# Patient Record
Sex: MALE | Employment: OTHER | URBAN - METROPOLITAN AREA
[De-identification: names, ages, dates, MRNs, and addresses within clinical notes are randomized per-mention and may not be internally consistent; named-entity substitution may affect disease eponyms.]

---

## 2017-02-21 ENCOUNTER — HOSPITAL ENCOUNTER (OUTPATIENT)
Dept: PULMONOLOGY | Age: 77
Discharge: HOME | End: 2017-02-21
Payer: MEDICARE

## 2017-02-21 PROCEDURE — G0424 PULMONARY REHAB W EXER: HCPCS

## 2017-02-21 NOTE — PROGRESS NOTES
[facility]  Outpatient Pulmonary Rehabilitation  Patient Assessment and Treatment Plan  Time in: 10:00   Time out: 12:00  DEMOGRAPHIC & RELATED DATA Patient Name: Amadou Barrett  [unfilled]  [unfilled]     1940 Age  78 y.o. Social Security #  /Medical Record #  [id number]  [id number]   Sex  M Marital Status     Emergency Contact Name  Extended Emergency Contact Information  Primary Emergency Contact: [place]  Address: [address] [facility] [address] Phone: [phone]  Relation: Spouse Phone (Area Code) Number     Emergency Contact Address     REFERRAL Source Name:  Padmini Tavares MD  [address] [facility], [address] Phone           Number   Referring Diagnosis  COPD Gold 3   Chief Complaint  SOB/Fatigue   PHYSICIANS PCP Name:    Cailtin Lewis MD Phone      (Area Code)      Number       Address:   [place]         (Area Code)      Number       Pulmonologist Name:  Maribell Bartholomew.  Phone      (Area Code)      Number       Address: FAX         (Area Code)      Number       HOME HEALTH If Yes, Agency Name  No (validate)   Phone      (Area Code)      Number       [] Yes    [x] No Address FAX         (Area Code)      Number       PATIENT REPORTING OF MEDICAL HISTORY Check All that Apply   Sleep Symptoms Daily Symptoms Additional Medical Disorders     [x] Within normal limits   [] Nocturnal                  Hypoxemia     [] Obstructive Sleep           Apnea     [] Increased sleep   [] Decreased sleep   [] Night Sweats   [] Insomnia   []Frequent Urination   [] Muscle Spasms    [] Daytime Sleepiness   [] Shortness of                    breath   [] Nightmares   [] Leg Jerking   [] Snoring   [] Sleeps in chair/sofa       due to shortness of      breath     [] Coughing   [] Heart Palpitations   [] Feather Pillows               used    [] Excessive                     worry   [] Nervousness   [] Panic episodes   [] Depressed [] Cough   [] Cough with                    Sputum     [] Shortness of          breath at rest     [] Wheezing     [] Chest Pain  [x] Shortness of        breath with mild exertion     [x] Shortness       of breath with moderate        exertion     [x] Weakness       /Fatigue   [] Lacking       endurance   [] Presence of         barrel chest   [] Utilizes                    shoulders,      chest & neck to aid in breathing     []  Dizziness     [] Ankle swelling     [] Hoarseness    [] Hypoxemia   [] Diabetes   []Neuropathy   [x]Hypertension   [] Osteoarthritis   [] Osteoporosis   [] Rheumatoid Arthritis     [] Fibromyalgia   [] Scoliosis  []Coronary Artery Disease   [] Atrial Fibrillation []Abnormal Posture   [x] Debility   [] CHF   [] Family history of COPD     [] Other:     RESPIRATORY HOME CARE EQUIPMENT   Use of the following:    []- Peak Flow Meter     []- Aerosol machine         []- Suction machine     []- Ventilator    []- Mechanical chest percussor    []- PEP valve   []- Oxygen:   Flow Rate: NA    []- None                                                                                       System:       See Summary List for additional Medical and Surgical History, Medications, and Allergies     PULMONARY HISTORY   Cough present: []- Yes     [x]- No    []- AM     []- PM    []- Nighttime     []- Around the clock       Mucus:  Color and amount            []- Thick    []- Thin   []- Moderate        When:  []- AM    []- PM   []- Around the clock    Mucus raised and/or alleviated by:     How often do you see your pulmonologist:    [x]- Every month    []- Every quarter  []- Every six months  []- Other:     Typically how often do you have lung infections: every few years    Number of times youve been hospitalized due to pulmonary condition (in last year):1    Number of times youve visited the E.D. due to pulmonary conditions (in last year):  0  Shortness of breath is alleviated by: seated rest, rescue inhaler VACCINATIONS []   Flu 95    [x]  Pneumonia      [x] Tetanus        IF NOT, Staff recommendation:   Recommend flu shot     OCCUPATIONAL / ENVIRONMENTAL HISTORY Retired? [] - YES              [x] - NO Type of work:   coaching   Occupational exposure: [] None [] Welding            [x] Asbestos      [] Mines/foundry         [] Pottery     [] Other   Residence  Type [x]     House                 []     Apartment                []     [address]                []     Assisted Living       []     Residential Living                []     Other: # Occupants  2 Relationship with Residents:   wife        # Levels: 1    # Stairs between levels:    # Stairs to enter: 4            Household pets:    []   Yes   [x]   No    If so, type:     Comments:    EDUCATIONAL HISTORY Highest Grade Achieved: Grade:    College Degree(s) Obtained / Major:  2 years of college   Orientation:      [x]- Time       [x]- Place       [x]- Person       []- Situation     If disoriented in any sphere, describe:  Prefers to Learn By:    []- Audio         []- Visual         []- Demonstration             []- Printed Material         [x]- No Preference   Barriers to Learning:   [x]- None         []- Language          []- Vision          []- Hearing          []- Emotional          []- Cognitive         Other (Describe):     ETHNIC / CULTURAL ISSUES Primary Language: [x]- English     Other  Specify:       Any Rigious, Ethnic or Cultural Practices/ Beliefs that May Influence Treatment?        []- Yes     [x]- No   If Yes, Describe:     HEARING/VISION/MOBILITY/ADLS Prosthesis:   [x]- None   Describe:   []- Hearing Impaired   ð Hearing Aids:   []- None     []-Bilaterally     []- Right     []- Left   [x]- Visually Impaired  ð  []- Legally Blind       [x]- Glasses       []- Contact Lenses   Ambulation:   [x]- By Self     []- Ambulates with Assistance ð  []- Cane    []- Crutches     []- Brittany Hoit       []- Wheelchair      []- Unable to Ambulate   Adaptive Equipment: []- None      [x]- Shower Seat       [x]- Grab Bars       []- Reachers       [x]-Non-Slip Floor Mat   Comments:   PAIN ASSESSMENT  Numeric Pain Scale 1-10 Is pain present or has it been present within the past 6 months? yes     Chest Pain:  []- Yes     [x]- No    If yes, rating:   Level at which pain is unacceptable:   Freq/Duration:     Aggravated by: Alleviated by:      Other Pain Location and Rating:  []- Fingers ____    []- Hand ____   []- Wrist ____    []- Elbow ____    []- Shoulder ____ []- Neck ____                                              []- Back ____   [x]- Hip left____    []- Leg ____   []- Knee ____   []- Ankle ____   []- Foot ____    []- Toes ____  []- Other _____________    Level at which pain is unacceptable: 5   Frequency/Duration: daily    Aggravated by: walking     Alleviated by: silvia brown      INSPIRATORY MUSCLE ASSESSMENT   Negative Inspiratory Force Test Results:  cwp* (< -60 cwp indicates IMT need)    [x]- Not applicable                []- Not available       FALL RISK ASSESSMENT If 3 or more are checked, patient requires Fall Precautions   [] -  History of Fall(s) within last 3 months   [x] -  Altered Mental Status - altered level of consciousness, confused/disoriented, unable to understand/remember, cognitive impairment, or alcohol use   [] -  Altered Mobility - unsteady gait, requires use of assistive device (cane, wheelchair, walker), or requires staff assistance   [x] -  Predisposing Diseases/Diagnosis - sensory impairments, neurological disorder, hypotension, orthostatic hypotension, vertigo, seizures, arthritis/osteoporosis, or age less than 3 years or greater than 65 years   [] -  Environment  IV, ECG leads, O2, [place]   [] -  Medications  does patient take 4 or more home medications     FUNCTIONAL ACTIVITIES OF DAILY LIVING  Check All Deficits related to shortness of breath, fatigue, pain and/or emotional or psychological elements   Functional Mobility Bathing/Grooming/Dressing Household activities Meal/yard maintenance   [] Bed transfers  [] Chair transfers  [] Toilet transfers  [] Automobile transfers  [] Tub/shower transfers  [x] Ambulation on level ground  [x] Ambulation on slight incline  [x] Stairs  [x]  Carrying objects  []  Grocery shopping  []  Walking to [employer] []  Bathing / Showering  []  Washing hair  []  Washing / Drying upper body  []  Washing / Drying lower body  []  Combing hair  []  Brushing teeth  [] Make-up application  []  Shaving  []  Upper body dressing  []  Lower body dressing  []  Donning / Home socks and/or shoes [] Vacuuming   Has never performed NA  [] Mopping  [] Sweeping  [] Garbage removal  [] Making bed  [] Changing sheets  [] Placing / Retrieving clothes in /       out washer    [] Placing / Retrieving clothes in /       out dryer    [] Ironing  [] Sorting / Folding clothes  [] Hanging clothes [] Light meal preparation  [] Cooking  [] Eating  [] Clean-up  [] Washing dishes  [] Loading / unloading     [] Pulling weeds  [] Planting flowers  [] Gardening  [] Raking leaves  [] Mowing grass   [] Painting   PATIENT REPORT OF PRIOR FUNCTIONAL LEVEL  In patients words, what could he/she do before breathing problems began? Did whatever he wanted   PATIENT REPORT OF CHANGE(S) IN FUNCTIONAL LEVEL In patients words, what has changed since breathing problems began? Slowed down activities. TRANSPORTATION CAPABILITY   []- Able to provide own transportation. [x]- Patient drives    []- Lack of reliable transportation. []- Medically unable to drive. []- Has a handicapped sticker    []- Patient has someone to drive them if needed    []- Financial constraints prohibit ability to provide own  transport. Determination of Need for Transportation (Check all that apply)       TOBACCO USE Current   Use?  Age Began Route Usual Daily Amount / Frequency Date Last Used    yes 15 cigarettes  current   Are you willing to participate in a Smoking Cessation Program? Yes NOTE:  Patients who currently smoke & are unwilling to stop tobacco use and/or participate in a smoking cessation program are ineligible for pulmonary rehabilitation. Comments:      EDUCATIONAL NEEDS Check All that Apply   COPD & LUNG DISEASE Lacks understanding of components of disease no    Lacks understanding of disease and disease progression no    Lacks understanding of environmental changes affecting lung function (temperature, humidity, wind, allergies, etc.) yes   BREATHING RE-TRAINING Lacks independent Pursed Lip Breathing (PLB)? yes    Lacks independent Diaphragmatic Breathing (DB)? yes    Narinder Rate of Perceived Exertion (RPE) Scale of > ten (10) while resting? no    Exhibits abnormal respiratory rate? no    Rests arms on table to recruit accessory muscles? no   COLLABORATIVE SELF-MANAGEMENT   (Signs & symptoms of Infections & Peak Flow Meters) Exhibits > one (1) lung infections(s) in the past year? yes    Lacks evidence of self-monitoring signs for infections? yes   MEDICATIONS Non-compliant with medications? no    Exhibits knowledge deficit related to side effects of medication? no    Exhibits knowledge deficit related to use and purpose of medications? no    Prescribed supplemental Oxygen                                                                         [] N/A  Not applicable    Exhibits knowledge deficit related to oxygen use? [] N/A                                                             Not applicable    Unable to demonstrate correct usage of Metered Dose Inhaler (if indicated)? [] N/A   No    Unable to demonstrate correct usage of spacer (if indicated)? [] N/A  Not applicable   TRAVEL & ENVIRONMENT Exhibits allergic reaction to allergens (pets, plants, dust, mold, etc.)?  yes and n/a    Travel restricted due to shortness of breath? Yes   BRONCHOPULMONARY HYGIENE /   SECRETION CLEARANCE & MANAGEMENT Complains of sputum/mucus production? no    Diagnosed as having chronic bronchitis? no    Diagnosed as having bronchiectasis? no    Demonstrates poor cough/baez techniques? yes    Complains of loose non-productive cough? no    Patient uses chest physiotherapy (CPT)? no    Patient uses high frequency chest wall oscillation (HFCWO)? no    Exhibits knowledge deficit related to proper fluid intake? yes   SEXUALITY &   LUNG DISEASE Patient desires information on sexuality with lung disease? yes   STRESS MANAGEMENT/  EMOTIONAL HEALTH Patient exhibits or reports increased levels of stress? no    Patient does not practice relaxation techniques?  yes     PSYCHOSOCIAL ASSESSMENT Mental and Emotional functioning impacted by deficits, limitations, and/or symptoms associated with the patients pulmonary disease          Attitude                   [x] - Cooperative          [] - Guarded          [] - Uncooperative          [] - Hostile        [] - Apathetic       [] - Defensive            Self-concept             [x] - Intact         [] - Helpless          [] - Hopeless          [] - Grandiose        [] - Self-deprecating       [] - Depersonalized            Consciousness         [x] - Alert          [] - Hyper-alert      [] - Lethargic          [] - Clouded           [] - Unresponsive       [] - Lacks focus/attention            Mood                       [x] - Stable       [] - Depressed     [] - Anxious       [] - Irritable        [] - Angry                                                     [] - Calm           [] - Euphoric        [] - Guilty           [] - Grieving                   Areas of function acutely / negatively affected by patients pulmonary disease:        [] - Marital/Intimacy/Family       [] - Financial        [] - Safety     [] - Spiritual          [] - Sexual             [] - Activities of Daily Living       [] - Vocational     [x] - Social      [x] - Recreation/Leisure        Aspects of family and home situation that may affect treatment:       Negatively  [] - Lacks support system      [] - Financial      [] - Legal problems    [] - Experiences Isolation                           [] - Dependent care    [] - / spouse/significant other    [] - Other health issues     Positively     [] - Spiritual        [x] - [place], caring, support system      [x] - Living spouse or significant other                              [] - Socially included                 Evidence of abuse/neglect:     [] - Verbal       [] -Physical     [] - Emotional    [] - Sexual      [] - Substance     [] - Addictive      [] - Financial        Does the patient report any of the following? [] - Depression           [] - Anxiety               [] - Panic                       [] - Poor Coping       How much stress does the patient report? [x] - None        [] - Low        [] - Moderate        [] - High            Please see attached [facility] COOP (Quality of Life) and PHQ-9 (Depression Tool). Exercise (REQUIRED)    Initial Assessment    BRODY Varner/L  2017,10:37 AM Initial Plan    Goals/Interventions/Education  (Exercise Prescription) 30-Day Re-Assessment/POC    Date/Time: 3/20/17 11:00 AM    Initials:  [facility]  60-Day Re-Assessment/POC    Date/Time:17 [facility]    Initials: [facility]   Tool  Six-minute walk test    Initial Walk: 816 ft  Distance    Pre- Vitals:  HR: 89       O2 Sat:  95  BP:   132/77      L of O2: 0  RPE:6       RPD:1   RPP:1    During Vitals:  HR:  111      O2 Sat:  94  BP: 125/78      L of O2: 0  RPE:  15     RPD: 3  RPP: 7    Post Vitals:  HR:    89    O2 Sat:  95  BP:    124/81     L of O2: 0  RPE:  15     RPD: 4  RPP: 7  Speed:  1.5 mph  Met Level:  2.15    Assistive Device:    []  Yes   [x]  No            Rest Breaks:  [x]  Yes  []  No  If yes, amount of rest time: stopped after 5 min.            Pain: [x]  Yes   []  No     Comments:  Pain in left hip. []  Appropriate  BP / HR / O2 response to exercise    Discharge Walk: 975  Distance     Pre- Vitals:  HR: 92       O2 Sat:  95  BP: 128/78        L of O2:0  RPE:   6    RPD: 1  RPP: 1    During Vitals:  HR:  82      O2 Sat:  98  BP:  147/90       L of O2:0  RPE:  8     RPD: 1  RPP: 1    Post Vitals:  HR:   96     O2 Sat:  97  BP:   126/76      L of O2: 0  RPE: 12       RPD:3   RPP:5  Speed: 1.8 mph   Met Level: 2.38  Assistive Device:   []  Yes [x]  No            Rest Breaks:   []  Yes  [x] No  If yes, amount of rest time:           Pain:   [x] Yes   []  No          Comments: pain in shins. Date/Time: 8/9/17    Initials: [address] Short-term goals (to be achieved in 4 weeks):  [x] Compliant with Exercise Prescription > 80% of time   [x] Participation in identified educational sessions  Other:     Exercise Pres./Interventions  Aerobic Mode:   [x] Treadmill  [x] Bike  [x] NuStep  [x] Arm Ergometer  [x]  Other: stair climbing, ambulation. Frequency: 2 days/week  Duration: Up to 39  Min per mode      Intensity: RPE 6   To 15    ; RPD < 2              Strength training Mode:  [x]  Free Weights/Thera-bands  Frequency: 2days/week  Duration: Up to 35  min  Intensity: 1-20 sets; 5-30 reps;  RPE: 6    to 15      Breathing Exercise Mode:  []  IMT/PEP  [] Weight Diaphragm Breathing  [x] Incentive Spirometer  Frequency: 2 days/week  Duration: Up to 30   Min per mode  Intensity: RPE: 6 to 15. Education Topics (see education log):  [x]  RPD/RPE/Pain Scale  [x]  Exercise: Safety & Monitoring  [x]  Home Exercise Plan  [x]  Collaborative Self-Mgt  []  Collaborative Self-Mgt  []  Other:     LTG (to be achieved by discahrge)  [x] Exercise > 95   Min/week with O2 Sat > 89   , RPD < 2  ,  [x] Increase 6MW test by 50  Feet.    *Please see recent flow sheets    STG Status:  Compliant with Exercise Prescription  [] Yes   [x] No    If no, describe:has been sick missed most visits. Participation in educational sessions  [x] Yes   [] No    If no, describe: Other:     Updated Exercise Prescription  Aerobic Mode:   [x]  Treadmill  [x] Bike  [x] NuStep  [x]  Arm Ergometer  [x]  Other:stair climbing, ambulation    Frequency: 2 days/week  Duration: Up to 39  Min per mode      Intensity: RPE  6  To  15   ; RPD < 2      Strength training Mode:  [x]  Free Weights/Thera-bands  Frequency: 2 days/week  Duration: Up to 35 min/mode. Intensity: 1-5 sets; 5-20 reps;  RPE:  6   to 15      Breathing Exercise Mode:  [x]  IMT/PEP  [x] Weight Diaphragm Breathing  [x] Incentive Spirometer  Frequency: 2 days/week  Duration: Up to30   Min per mode  Intensity: RPE:6 to 12. LTG (to be achieved by discahrge)  [x] Exercise > 95  Min/week with O2 Sat > 89   , RPD < 2    ,  [x] Increase 6MW test by 50  Feet. *Please see recent flow sheets    STG Status:  Compliant with Exercise Prescription  [] Yes   [x] No    If no, describe:out for 2 weeks for oral surgery. Participation in educational sessions  [x] Yes   [] No    If no, describe: Other:      Updated Exercise Prescription  Aerobic Mode:   [x]  Treadmill  [x]  Bike  [x]  NuStep  [x]  Arm Ergometer  []  Other:      Frequency: 2 days/week  Duration: Up to 39  Min per mode      Intensity: RPE  6  To 15    ; RPD < 2      Strength training Mode:  [x]  Free Weights/Thera-bands  Frequency: 2 days/week  Duration: Up to 35  min  Intensity: 1-5 sets; 15 -20 reps;  RPE: 6    to 15. Breathing Exercise Mode:  [x]  IMT/PEP  [x] Weight Diaphragm Breathing  [x] Incentive Spirometer  Frequency: 2 days/week  Duration: Up to 30   Min per mode  Intensity: RPE:6 to 12. LTG (to be achieved by discahrge)  [x] Exercise >   Min with O2 Sat >89   , RPD < 2    ,  [x] Increase 6MW test by 50  Feet.      Nutrition (REQUIRED)           Initial Assessment   GIANCARLO Elizabeth  2/21/2017,10:37 AM Initial Plan    Goals/Intervention/Education  30 Day Re-assessment/POC    Date/Time: 3/20/17 11:00 AM  Initial: [address] 60 Day Re-assessment/POC    Date/Time:4/18/17 1:00 PM    Initial: [address]        Tool  Rate Your Plate    Pre -RYP Score:  60  (Healthy Diet >57)    Special Diet? []  Yes  [x]  No          If yes, describe:        Caffeine Intake? [x]  Yes   []  No          If yes, amount:   2 cups              Alcohol Intake? [x]  Yes   [] No          If yes, amount:   A drink every few months    Current Weight:  149   Current Height: 5 ft 6   Current BMI: 24      To calculate BMI:          Weight (pounds)           Height (inches)2   x 703 =                        <18.5 = Underweight      18.5-24.9 = Normal       25-29.9 = Overweight     >30 = Obese   Short-Term Goals (to be achieved in 4 weeks):  []  Compliant with            prescribed, specialized      diet. [x]  Participate in nutritional       health classes    []  Improve RYP score to:  (if checked, RYP must be reassessed at 30 days)    [] Other:        Interventions:  [x]  Review Eating Habits  [x]  Review Nutritional      Screening  []  Encourage  Healthy             Diet  []  Recommended or requested 1:1 Dietary Consult    Education Topics (see educational log):  [x]  Nutritional Health Benefits         LTG (to be achieved by discharge)   [x] Patient will verbalize an understanding of a life-long adherence to a healthy diet and the impact on health condition(s). *Please see recent flow sheets  STG Status:  Compliant with prescribed, specialized diet     % of the time per patient report. Participate in nutritional health classes   [x]  Yes   []  No          If no, describe:      Current RYP score: Other:        Dietician Consult Completed    []  Yes   []  No   []  N/A    UPOC:  Pt will continue to:   []  Be compliant with prescribed,  specialized diet    [x] Participate in nutritional health classes.     [] Improve RYP score to:    RYP needs to be reassessed if still active as STG      LTG (to be achieved by discharge)   [x] Patient will verbalize an understanding of a life-long adherence to a healthy diet and the impact on health condition(s). *Please see recent flow sheets  STG Status:  Compliant with prescribed, specialized diet      % of the time per patient report. Participate in nutritional health classes. [x] Yes [] No          If no, describe:      Current RYP score: Other:        Dietician Consult Completed    [] Yes   [] No    []N/A    UPOC:  Pt will continue to:   []  Be compliant with prescribed, specialized diet. [x]  Participate in nutritional health classes. [] Improve RYP score to:    RYP needs to be reassessed if still active as STG      LTG (to be achieved by discharge)   [x] Patient will verbalize an understanding of a life-long adherence to a healthy diet and the impact on health condition(s). Psychosocial (REQUIRED)  Initial Assessment    RBODY Porras/L  2/21/2017,10:37 AM  Initial Plan    Goals/Intervention/Education 30-Day Re-Assessment/POC      Date/Time: 3/20/17 11:00 AM  Initials: [address] 60-Day Re-Assessment/POC    Date/Time: 4/18/17 1:00 PM  Initials: [address]   Tool (Quality of Life)  [facility]*     *Please see attached. Tool (Depression) PHQ 9  Score: 1   Score ? 5, PHQ 9 should be reassessed every 30 days. []  Poor Coping skills    []  Currently on psychotropic medication    Average Sleep Hours:6    []  Abnormal Sleep Patterns (Sleep Apnea; Snoring)    []  Currently Seeing Counselor/Physician    Positive Support System? [x]  Yes   []  No           []  Recommend additional behavioral health assessment    Currently smoking? [x]  Yes   []  No            5- 6 a day. Short Term Goals (to be achieved in 4 weeks):  [x]  Manage and reduce stress per patient report.     []  Improve PHQ 9 score to:      []  Increase average sleep hours by:     [x]  Participate in emotional health class. [x]  Decrease smoking by 25 %. []  Other:     Interventions:  [] Re-assess PHQ-9 every 30-Day Review if  > 5    [x]  Provide simple relaxation techniques practiced daily    []  Refer behavioral health psychotherapy needs to Physician/licensed psychotherapist.    Education Topics (see education log):    [x]  Emotional Health    []  Importance of adequate sleep  [x]  Smoking cessation    LTG (to be achieved by discharge)  [x] Use relaxation techniques when stressors are identified and verbalize coping skills and decrease vulnerability to stress. [x] Maintain decreased smoking behaviors/smoking cessation  [] Improve PHQ 9 score  [x] Improve [employer] scores and maintain a healthy psychosocial well-being *Please see recent flow sheets    STG Status:  Manage and reduce stress per patient report. [x]  Yes   []  No          If no, describe:      PHQ 9 score is:      Average sleep hours:     Participate in emotional health class. [x]  Yes   []  No          If no, describe:        Decreased smoking by:   ? % - pt not available to assess. UPOC:  Pt will continue to:   [x]  Manage and reduce stress per patient report. []  Improve PHQ 9 score to:    []  Manage sleep as evidenced by average sleep hours of:        [x] Participate in emotional health class. [x]  Decrease smoking by 25   %    Other:      LTG (to be achieved by discharge)  [x] Use relaxation techniques when stressors are identified and verbalize coping skills and decrease vulnerability to stress. [x] Maintain decreased smoking behaviors/smoking cessation  [] Improve PHQ 9 score  [x] Improve [employer] scores and maintain a healthy psychosocial well-being *Please see recent flow sheets    STG Status:  Manage and reduce stress per patient report. [x]Yes    [] No          If no, describe:     PHQ 9 score is:      Average sleep hours:      Participate in emotional health class.    [x]  Yes []  No          If no, describe: Decreased smoking by: 0 %    UPOC:  Pt will continue to:   []  Manage and reduce stress per patient report. []  Improve PHQ 9 score to:    []  Manage sleep as evidenced by average sleep hours of:      [x]  Participate in emotional health class. [x]  Decrease smoking by 25 %    Other:      LTG (to be achieved by discharge)  [x] Use relaxation techniques when stressors are identified and verbalize coping skills and decrease vulnerability to stress. [x] Maintain decreased smoking behaviors/smoking cessation  [] Improve PHQ 9 score  [x] [facility] and maintain a healthy psychosocial well-being     Oxygen (REQUIRED)    Initial Assessment  Ramon Emanuel, OTR/L  2/21/2017,10:37 AM Initial Plan    Goals/Interventions/Education 30-Day Re-Assessment/POC    Date/Time: 3/20/17 11:00 AM   Initials: [address]  60-Day Re-Assessment/POC    Date/Time:  4/18/17 1:00 PM    Initials:MK        Evidence of pulmonary/respirator diagnosis and/or associated symptoms? [x]  Yes   []  No    Patient is prescribed Oxygen? []  Yes   [x]  No          If yes, current prescription? Compliant with current prescription? []  Yes   [] No   [x]  N/A              Understands specifics of Oxygen prescription? []  Yes  []  No   [x]  N/A              Understands all safety protocols regarding Oxygen usage? []  Yes  []  No    [x] N/A              Evidence of de-saturation with activity (six-minute walk test)       [x]  Yes   []  No              Short-Term Goals (to be achieved in 4 weeks):  []  Compliant with Oxygen prescription. [x]  Maintain Oxygen saturations > 89  at rest.    [x]   Maintain Oxygen saturations >89   with exertion. [x]   Utilize PLB 25 % of the time without prompting. [x]  Utilize DB25  % of the time without prompting.     []   Other:      Interventions:    [x]   Discuss and review pursed-lip breathing       [x]   Discuss and review diaphragmatic breathing    [x]   Monitor oxygen saturations throughout prescribed treatment    [x] Maintain patient's oxygen saturation    To 90  % during exercise and titrate 1-2 L/M until consistently at   90  %    Education Topics (see educational log):    []  Oxygen importance   []  Oxygen safety   []  Oxygen and Travel  []  Other:    LTG (to be achieved by discharge)    [x] Patient will independently perform pursed-lip breathing  [x] Patient will independently perform diaphragmatic breathing  [] Patient will follow Oxygen prescription *Please see recent flow sheets    Changes in Prescription:  []  Yes   []  No           If yes, describe:     STG Status:  [] Compliant with Oxygen prescription. [x] Maintain Oxygen saturations >89  at rest.     [x] Maintain Oxygen saturations > 89 with exertion. [x]   Utilize PLB ? % of the time without prompting. Pt had only one visit goal not met. [x]  Utilize DB 0 % of the time without prompting. Pt had only 1 visit. Goal not met. Other:         UPOC:  Pt will continue to:   [] Be compliant with Oxygen prescription. [x]  Maintain Oxygen saturations >89   at rest.  [x]  Maintain Oxygen saturations >89 with exertion. [x] Practice PLB  25  % of the time without prompting. [x]  Practice DB   25 % of the time without prompting. []  Other:     LTG (to be achieved by discharge)    [x] Patient will independently perform pursed-lip breathing  [x] Patient will independently perform diaphragmatic breathing  [] Patient will follow Oxygen prescription *Please see recent flow sheets    Changes in Prescription:  []  Yes   []  No           If yes, describe:     STG Status:  [] Compliant with Oxygen prescription. [x] Maintain Oxygen saturations >89   at rest.     [x] Maintain Oxygen saturations >89  with exertion. [x]   Utilize PLB 20 % of the time without prompting. [x]  Utilize DB 10  % of the time without prompting. Other:        UPOC:  Pt will continue to:   []  Be compliant with Oxygen prescription.   [x] Maintain Oxygen saturations > 89 at rest.  [x]  Maintain Oxygen saturations >89  with exertion. [x]   Practice PLB 25 % of the time without prompting. [x]   Practice DB 25 % of the time without prompting. []  Other:      LTG (to be achieved by discharge)    [x] Patient will independently perform pursed-lip breathing  [x] Patient will independently perform diaphragmatic breathing  [] Patient will follow Oxygen prescription     Core Measures Congestive Heart Failure    Initial Assessment    Nitza Haskins OTR/L  2/21/2017,10:37 AM Initial Plan    Interventions/Education/Goals 30-Day Re-Assessment/POC    Date/Time: 3/20/17 11:00 AM  Initials: [address]  60-Day Re-Assessment/POC    Date/Time: 4/18/17 1:00 PM  Initials: [address]     [x]  Not a Risk Factor at This Time. No plan of care required. []  Risk Factor:        []  Current Dx of CHF        []  Hx of CHF    []  EF:     Date:   Source:   NYHA Symptom Class:          []   II          []   III          []  IV    Prescribed CHF Medications? []  Yes  []  No            Compliant with CHF Meds? []  Yes []   No  []  N/A     Has Scale at Home?        []  Yes   []  No     Weighs Daily? []  Yes   []  No                Knows Baselines (normal weight, B/P, HR, amount of activity tolerated before getting tired, etc.)? []  Yes   []  No      Knows signs/symptoms of worsening CHF (wt gain, SOB, swelling, fatigue, etc.)? []  Yes  []  No      Sleeps with propped up pillows? []  Yes  []  No     If yes, how many?       Short Term Goals (to be achieved in 4 weeks):  []  Identification of individual Baselines  (see left)  []  Understanding of & able to act upon signs/symptoms of worsening CHF   []  Monitors weight daily at home  []  Monitors BP at home  []  Compliant with medications  []  Compliant w/ diet and sodium intake  []  Monitors home fluid intake  []  Exercises daily   []  Other:      Interventions    []  Self-Management Care     []  Provides CHF educational class(es)    [] Other:    Education Topics (see educational log)    []  Living with CHF    LTG (to be achieved by discharge)  [] Able to verbalize individual baselines, signs and symptoms of worsening heart failure and appropriate self-management  [] Compliant with medications  [] Compliant with sodium intake and fluid intake  [] Weighs self daily  [] Monitors BP at home *Please see recent flow sheets     Newly Diagnosed:  []  Yes  []  No   [x] N/A  If yes, describe date of onset, class and medication changes:    STG Status/UPOC:  Per patient report: Identifies own individual Baselines  (see left):  []  Yes  []  No, continue      Understands and able to act upon symptoms of worsening CHF:   []  Yes   []  No, continue    Weighs self daily:   [] Yes   []  No, continue      Monitors BP at home:   [] Yes   []  No, continue      Compliant with medications:   [] Yes   []  No, continue      Compliant w/ diet and sodium intake:   [] Yes   []  No, continue      Monitors home fluid intake:   [] Yes   []  No, continue      Exercises daily:  [] Yes   []  No, continue      [] Other:   [] Yes   []  No, continue                                         LTG (to be achieved by discharge)  [] Able to verbalize individual baselines, signs and symptoms of worsening heart failure and appropriate self-management  [] Compliant with medications  [] Compliant with sodium intake and fluid intake  [] Weighs self daily  [] Monitors BP at home *Please see recent flow sheets    Newly Diagnosed:  []  Yes  []  No   [] N/A  If yes, describe date of onset, class and medication changes:    STG Status/UPOC:  Per patient report:   Identifies own individual Baselines  (see left):  []  Yes  []  No, continue      Understands and able to act upon symptoms of worsening CHF:   []  Yes   []  No, continue    Weighs self daily:   [] Yes   []  No, continue      Monitors BP at home:   [] Yes   []  No, continue      Compliant with medications:   [] Yes   []  No, continue      Compliant w/ diet and sodium intake:   [] Yes   []  No, continue      Monitors home fluid intake:   [] Yes   []  No, continue      Exercises daily:  [] Yes   []  No, continue      [] Other:   [] Yes   []  No, continue     LTG (to be achieved by discharge)  [] Able to verbalize individual baselines, signs and symptoms of worsening heart failure and appropriate self-management  [] Compliant with medications  [] Compliant with sodium intake and fluid intake  [] Weighs self daily  [] Monitors BP at home       Core Measures Hypertention  Initial Assessment    Carlton Bello BLADER/L  2/21/2017,10:37 AM Initial Plan     Goals/Interventions/Education 30-Day Re-Assessment/POC    Date/Time: 3/20/17 11:00 AM    Initials: [address] 60-Day Re-Assessment/POC    Date/Time:4/18/17 1:00 PM    Initials: [address]     []  Not a Risk Factor at This Time. No plan of care required.      [x]  Hypertension Risk Factor    [x]  On Blood Pressure Meds  [x]  Medication Regimen Cmpliance    [x]  Yes  []  No   [x]  Monitors BP at Home          [x]  Yes   []  No  []  No B/P monitoring system at home  []  If No B/P monitoring system at Home, Encouraged to Purchase  [] Target BP: (if different from standing order)                * Resting blood pressure obtained prior to six-minute walk test. Short Term Goals (to be achieved in 4 weeks):  []  BP Medication compliance  []  Low Na+ diet  [x]  Monitors BP at home daily  [] Resting BP :     []Other:   Interventions  [x]  Monitor BP before and after exercise  Education Topics (see education log):  [x]  Living with Hypertension        LTG (to be achieved by discharge)  [x] Consistent resting blood pressure <140/90  [x] Monitors BP at home  [] BP medication compliance     *Please see recent flow sheets    Newly Diagnosed:  []  Yes  [x]  No   [] N/A  If yes, describe date of onset, class and medication changes:    STG Status/UPOC:  Per patient report    BP Medication compliance:  [] Yes   []  No, continue    Low Na+ diet:  [] Yes   []  No, continue    Monitors BP at home:  [] Yes   [x]  No, continue  Unable to assess pt had only one visit since eval.  Resting BP (obtained by clinician):not available.  [] Yes   []  No, continue    Other: *Please see recent flow sheets    Newly Diagnosed:  []  Yes  [x]  No   [] N/A  If yes, describe date of onset, class and medication changes:    STG Status/UPOC:  Per patient report    BP Medication compliance:   [] Yes   []  No, continue    Low Na+ diet:  [] Yes   []  No, continue    Monitors BP at home:  [x] Yes   []  No, continue    Resting BP (obtained by clinician) 121/81  [] Yes   []  No, continue    Other:          LTG (to be achieved by discharge)  [x] Consistent resting blood pressure <140/90  [x] Monitors BP at home  [] BP medication compliance   LTG (to be achieved by discharge)  [x] Consistent resting blood pressure <140/90  [x] Monitors BP at home MET 4/18/17  [] BP medication compliance     Core Measures Diabetes    Initial Assessment    BRODY Sanchez/L  2/21/2017,10:37 AM Initial Plan      Goals/Interventions/Education 30-Day Re-Assessment/POC    Date/Time:3/20/17 11:00 AM     Initials: [address] 60-Day Re-Assessment/POC    Date/Time: 4/18/17 1:00 PM    Initials: [address]   [x]  Not a Risk Factor at This Time. No plan of care required. []  Diabetes Risk Factor    []  Type I  []  Type II      []  Insulin Dependent  []  Insulin Pump? []  Oral Hyperglycemic            Medications  []  Diet Controlled  []  Newly Diagnosed      Patient Owns a Glucometer             [] Yes   []  No      [] Monitors BS @ home              [] Yes   []  No     If yes, frequency/day:    BS Range at home:     How Long a Diabetic?      Pt compliant With Meds                   []  Yes     []  No      Pt Compliant With Diabetic Diet                 []  Yes       []  No      HbA1c    (Desired <7%)    Date:     [] HbA1c Unavailable Short Term Goals (to be achieved in 4 weeks):  [] Compliant with diabetic medication  [] Compliant with diabetic diet  [] Monitors blood sugar at home  [] Takes appropriate corrective actions when blood glucose is out of range   Other:   Interventions  []  Review patient's reported blood sugar pre and post exercise, as needed based on signs/symptoms.   Education Topics (see education log):  [] Living with Diabetes  LTG (to be achieved by discharge)  [] Blood sugar <   And >   For exercise  [] Patient is diabetic medication compliant  [] Patient controls blood sugar via diet  [] Blood sugar <   And >  For fasting blood sugar   *Please see recent flow sheets    Newly Diagnosed:  []  Yes  []  No   [] N/A  If yes, describe date of onset, class and medication changes:    STG Status/UPOC:  Per patient report    Compliant with diabetic medication:   [] Yes   []  No, continue    Compliant with diabetic diet:  [] Yes   []  No, continue    Monitors blood sugar at home:  [] Yes   []  No, continue    Takes appropriate corrective actions when blood glucose is out of range:  [] Yes   []  No, continue    Other:     LTG (to be achieved by discharge)  [] Blood sugar <   And >   For exercise  [] Patient is diabetic medication compliant  [] Patient controls blood sugar via diet  [] Blood sugar <   And >  For fasting blood sugar   *Please see recent flow sheets    Newly Diagnosed:  []  Yes  []  No   [] N/A  If yes, describe date of onset, class and medication changes:    STG Status/UPOC:  Per patient report    Compliant with diabetic medication:  [] Yes   []  No, continue      Compliant with diabetic diet:  [] Yes   []  No, continue    Monitors blood sugar at home:  [] Yes   []  No, continue    Takes appropriate corrective actions when blood glucose is out of range:  [] Yes   []  No, continue    Other:     LTG (to be achieved by discharge)  [] Blood sugar <   And >   For exercise  [] Patient is diabetic medication compliant  [] Patient controls blood sugar via diet  [] Blood sugar <   And >  For fasting blood sugar                             Initial Assessment Clinician Notes:   Patient Goal(s):   Dixie Olson was an active participant in the development of this ITP and is agreeable to treatment within an open gym environment. Supervising Physician: Dr. Clotilde Avelar, Hospitalist  Kailash Palacio, OTR/L  2/21/2017,10:37 AM  Medical Director ITP and Exercise Prescription Approval  Pulmonary Rehabilitation    Initial Evaluation and Exercise Prescription:  The Medical Directors electronic co-signature validates that provider has examined the patient and reviewed the findings. The electronic signature certifies the need for Pulmonary Rehabilitation furnished under this plan of treatment and while under the providers care. The co- signature certifies agreement with the plan and certifies that this therapy is necessary at this time      Certification Period: 2/21/17 to 3/22/17    30-Day Reassessment and Physician (face-to-face) 30-Day Visit  Patient's functional level; progress towards goals; carry-over learning within the home; problems, concerns and/or deficits; treatment plan (treatment modalities); and patient goals were reviewed with the patient. The Medical Directors electronic co-signature validates that provider has examined the patient and reviewed the findings. The electronic signature certifies the need for Pulmonary Rehabilitation furnished under this plan of treatment and while under the providers care. The co- signature certifies agreement with the plan and certifies that this therapy is necessary at this time      Certification Period: 3/23/17 to 4/21/17  60-Day Reassessment and Physician (face-to-face) 60-Day Visit  Patient's functional level; progress towards goals; carry-over learning within the home; problems, concerns and/or deficits; treatment plan (treatment modalities); and patient goals were reviewed with the patient.     The Medical Directors electronic co-signature validates that provider has examined the patient and reviewed the findings. The electronic signature certifies the need for Pulmonary Rehabilitation furnished under this plan of treatment and while under the providers care. The co- signature certifies agreement with the plan and certifies that this therapy is necessary at this time    Certification [id number]:1/84/47 to 5/24/17  Pulmonary Rehabilitation Discharge Assessment and Long-Term Goals    Exercise (Required):  [x] LTG. 1:  Exercise >95     Minutes of exercise weekly, with O2 saturations > 89     , RPD < 2         - LTG 1. Status: [x]  - Met   []Not Met Comments:    [x] LTG 2: Increase 6MW test by   50   Feet (Initial 6-MW Test feet: 816       ,Discharge 6-MW Test Distance:   975      )     - LTG 2. Status: [x] - Met  []Not Met Comments:      Discharge Plan:    []Maintenance Program    [x]HEP     []Other:     Nutrition (Required):  [x] LTG. 1:  Patient able to verbalize an understanding of life-long adherence to a healthy diet and the  impact on health conditions. Comments:    -LTG Status: [x]Met  []Not Met Comments:       Initial RYP score: 60   Discharge RYP score: 58  Discharge Plan: [x]Maintain dietary adherence     []Other:    Psychosocial (Required):  [x] LTG. 1:  Use relaxation techniques when stressors are identified and verbalize coping skills and                   decrease vulnerability to stress.    - LTG 1. Status: [x]Met  []Not Met Comments:  [x] LTG. 2: Maintain decreased smoking behaviors/smoking cessation      - LTG 2. Status: [x]Met  []Not Met Comments: patient cut back about 2 cigarettes a day.   [x] LTG. 3: Improve PHQ-9 score (Initial PHQ-9 score:   1   , Discharge PHQ-9 score   1    )      - LTG 3. Status: []Met  []Not Met Comments:   [x] LTG. 4: Improve [employer] scores and maintain a healthy psychosocial well-being     - LTG 4. Status: [x]Met  []Not Met Comments: increased physical fitness, better overall health, improved quality of life.  Discharge Plan: [x]Maintain healthy psychosocial well-being     []Other:    *See [facility] Quality of Life tool for pre and post-treatment screenings. Oxygen (Required):  [x] LTG. 1: Patient will independently preform pursed-lip breathing (initial 6MW test (during) Sats:   94        DC 6MW test (during) Sats :98     - LTG 1. Status: []Met  []Not Met Comments:       [x] LTG. 2: Patient will independently perform diaphragmatic breathing      - LTG 2. Status: []Met  []Not Met Comments:    [] LTG. 3: Patient will follow Oxygen prescription     - LTG 3. Status: []Met  []Not Met Comments:    Discharge Plan: [x]Maintain healthy oxygen saturations     []Other:       CHF (Prevention of Exacerbation): []Risk Factor [x]Not a Risk Factor at This Time  [] LTG. 1: Able to verbalize individual baselines, signs and symptoms of worsening heart failure and appropriate self-mangement. -LTG Status1: []Met  []Not Met Comments:  [] LTG. 2: Compliant with medications     -LTG Status 2: []Met  []Not Met Comments:  [] LTG. 3: Compliant with Sodium intake and fluid intake     -LTG Status 3: []Met  []Not Met Comments:  [] LTG. 4: Weighs self daily     -LTG Status 4: []Met  []Not Met Comments:    [] LTG. 5: Monitors blood pressure at home     -LTG Status 5: []Met  []Not Met Comments:       Discharge Plan:  []Maintain CHF exacerbation prevention     []Other:      Hypertension: [x]Risk Factor []Not a Risk Factor at This Time  [x] LTG. 1: Consistent resting blood pressure <    140/90       Initial Resting BP:  132/77       DC Resting BP:128/78     -LTG Status 1: [x]Met  []Not Met Comments:    [x] LTG.  2: Monitors Blood Pressure at home     -LTG Status 2: [x]Met  []Not Met Comments:     [] LTG. 3: Blood Pressure medication compliance        -LTG Status 3: []Met  []Not Met Comments:    Discharge Plan: [x]Maintain healthy blood pressure   []Other:     Diabetes:    [] Risk Factor      [x] Not a Risk Factor at This Time  [] LTG. 1: Blood Sugar <       And >         For exercise     -LTG Status 1: []Met  []Not Met Comments:    [] LTG. 2: Patient is medication compliant      -LTG Status 2: []Met  []Not Met Comments:    [] LTG. 3: Patient controls blood sugar via diet     -LTG Status 3: []Met  []Not Met Comments:     [] LTG. 4: Blood Sugar <        And >       For fasting blood sugar     -LTG Status 4: []Met  []Not Met Comments:    Initial Fasting Blood Glucose:          Discharge Fasting Blood Glucose:     Discharge Plan: []Maintain healthy glucose levels     []Other:      Clinicians Name: Ty Fofana [facility]   Date/Time  8/9/17  10:00 AM        I have completed the final 'direct contact' with the patient and  reviewed the above findings and concur with these findings with any changes and/or additional comments noted below.   I certify I have conducted the outcome assessment (above) based on patient-centered outcomes (including the [facility]) which includes objective clinical measurements of the effectiveness of the pulmonary rehabilitation program for this individual.

## 2017-02-23 ENCOUNTER — HOSPITAL ENCOUNTER (OUTPATIENT)
Dept: PULMONOLOGY | Age: 77
Discharge: HOME | End: 2017-02-23
Payer: MEDICARE

## 2017-02-23 PROCEDURE — G0424 PULMONARY REHAB W EXER: HCPCS

## 2017-02-23 NOTE — PROGRESS NOTES
[facility]  Outpatient Pulmonary Rehabilitation Flowsheet  [address]    [place]  1940       Patient's carry-over of treatment delivered by: 1st treatment post eval.    Objective Daily Findings      Respiratory Muscle Functioning/Exercise Conditioning/Strengthening Session:    Time In: 12:30  Time Out::1:30  Session Number: 2  O2 with Theapy: 0 L/min    Pre SPO2 95  Pre HR:92  Pre BP: 118/75    RR: 18 Wt: not tested by patient  Temp: not tested by patient   Post SPO2: 95 Post HR: 85  Post BP: 111/74    Self Care Home Management Instruction/Education    Self Care Home Management Instruction Education: Breathing Retaining and Collaborative Self Managment. Patient's Response to Education: Patient actively participated in education. Individual Therapy               Goal     Actual                      During Therapy                 Post-Therapy     % SpO2 HR RPD 1 - 4 RPE 6-20 Pain  1 - 10 % SpO2 HR RPD 1 - 4 RPE 6-20 Pain 0 - 10   Stretching/DB  /Monitoring 20 min 20 min             IS 20 min x 1500 20 min 750 96 87 3 6 1 97 85 4 6 1   IMT               Arm Bike 20 min x 20 coy 20 min x 15 coy 95 89 2 12 1 96 90 2 12 1   Weighted DB                                 Patient's Response to Therapy: Good effort and Good motivation. Time In: 1:30     Session Number:3  Time Out: 2:05     O2 with therapy: 0 L/min         Individual Therapy               Goal     Actual                      During Therapy           Post Therapy     % SpO2 HR RPD 1 - 4 RPE 6-20 Pain  1 - 10 % SpO2 HR RPD 1 - 4 RPE 6-20 Pain 0 - 10   Stretching/DB  /Monitoring 15 min 15 min             Stat Bike               Stepper 25 min x L5 20 min x L4 94 89 1 12 4 95 91 1 13 5   Treadmill               Rest/PLB                    Patient's response to therapy: Increase dyspnea and Increase fatique. Pain in legs.       Assessment    Patient's response: Patient progressing towardsd LTGs evident by:  Learning PLB. Plan: Continue as written    Code     Billin units      Physician supervision provided this date of service by: Dr. Jairon Angel     Therapist Signature:  BRODY Low/L    Therapist PRINTED Name and Credential: GIANCARLO Low    2017  12:45 PM

## 2017-02-28 ENCOUNTER — APPOINTMENT (OUTPATIENT)
Dept: PULMONOLOGY | Age: 77
End: 2017-02-28
Payer: MEDICARE

## 2017-03-02 ENCOUNTER — APPOINTMENT (OUTPATIENT)
Dept: PULMONOLOGY | Age: 77
End: 2017-03-02
Payer: MEDICARE

## 2017-03-07 ENCOUNTER — HOSPITAL ENCOUNTER (OUTPATIENT)
Dept: PULMONOLOGY | Age: 77
End: 2017-03-07
Payer: MEDICARE

## 2017-03-09 ENCOUNTER — APPOINTMENT (OUTPATIENT)
Dept: PULMONOLOGY | Age: 77
End: 2017-03-09
Payer: MEDICARE

## 2017-03-14 ENCOUNTER — APPOINTMENT (OUTPATIENT)
Dept: PULMONOLOGY | Age: 77
End: 2017-03-14
Payer: MEDICARE

## 2017-03-16 ENCOUNTER — APPOINTMENT (OUTPATIENT)
Dept: PULMONOLOGY | Age: 77
End: 2017-03-16
Payer: MEDICARE

## 2017-03-21 ENCOUNTER — HOSPITAL ENCOUNTER (OUTPATIENT)
Dept: PULMONOLOGY | Age: 77
End: 2017-03-21
Payer: MEDICARE

## 2017-03-23 ENCOUNTER — HOSPITAL ENCOUNTER (OUTPATIENT)
Dept: PULMONOLOGY | Age: 77
Discharge: HOME | End: 2017-03-23
Payer: MEDICARE

## 2017-03-23 PROCEDURE — G0424 PULMONARY REHAB W EXER: HCPCS

## 2017-03-23 NOTE — PROGRESS NOTES
[facility]  Outpatient Pulmonary Rehabilitation Flowsheet  [address] [place]  1940       Patient's carry-over of treatment delivered by: uses IS with mod cues. Objective Daily Findings    Respiratory Muscle Functioning/Exercise Conditioning/Strengthening Session:    Time In: 12:30  Time Out::1:30  Session Number: 4  O2 with Theapy: 0 L/min    Pre SPO2 97  Pre HR:89  Pre BP: 135/82    RR: 18 Wt: not tested by patient  Temp: not tested by patient   Post SPO2: 97 Post HR: 89  Post BP: 138/81    Self Care Home Management Instruction/Education    Self Care Home Management Instruction Education: Breathing Retaining. Patient's Response to Education: Patient actively participated in education. Individual Therapy               Goal     Actual                      During Therapy                 Post-Therapy     % SpO2 HR RPD 1 - 4 RPE 6-20 Pain  1 - 10 % SpO2 HR RPD 1 - 4 RPE 6-20 Pain 0 - 10   Stretching/DB  /Monitoring               IS 20 min x 1500 20 min x 2250 96 85 2 6 1 96 87 2 6 1   IMT               Arm Bike 25 min x 20 coy 20 min x 15 coy 96 86 2 13 7 97 87 2 13 7   Stepper 25 min x L3 20 min x L3 95 64 2 13 8 95 69 2 14 8   Arm Weights                 Patient's Response to Therapy: Good effort and Good motivation. C/o pain in shoulders and hip with exercise. Time In:      Session Number:  Time Out:      O2 with therapy: 0 L/min         Individual Therapy               Goal     Actual                      During Therapy           Post Therapy     % SpO2 HR RPD 1 - 4 RPE 6-20 Pain  1 - 10 % SpO2 HR RPD 1 - 4 RPE 6-20 Pain 0 - 10   Stretching/DB  /Monitoring               Stat Bike               Stepper               Treadmill               Rest/PLB                    Patient's response to therapy: Good effort and Good motivation      Assessment    Patient's response: Patient progressing towardsd LTGs evident by:   Increased PLB and Increased Pacing. Plan: Continue as written    Code     Billin units      Physician supervision provided this date of service by: Dr. Erickson Flowers     Therapist Signature:  GIANCARLO Sanchez    Therapist PRINTED Name and Credential: GIANCARLO Sanchez    3/23/2017  1:02 PM

## 2017-03-28 ENCOUNTER — HOSPITAL ENCOUNTER (OUTPATIENT)
Dept: PULMONOLOGY | Age: 77
Discharge: HOME | End: 2017-03-28
Payer: MEDICARE

## 2017-03-28 PROCEDURE — G0424 PULMONARY REHAB W EXER: HCPCS

## 2017-03-28 NOTE — PROGRESS NOTES
[facility]  [facility], [address] [place], [place] [address] [facility]    Js Jean-Baptiste  1940       Patient's carry-over of treatment delivered by: none noted today. Objective Daily Findings    Respiratory Muscle Functioning/Exercise Conditioning/Strengthening Session:    Time In: 12:30  Time Out::1:30  Session Number: 5  O2 with Theapy: 0 L/min    Pre SPO2 95  Pre HR:93  Pre BP: 114/71    RR: 18 Wt: 152  Temp: not tested by patient   Post SPO2: 95 Post HR: 90  Post BP: 112/69    Self Care Home Management Instruction/Education    Self Care Home Management Instruction Education: Breathing Retaining. Patient's Response to Education: Patient actively participated in education. Individual Therapy               Goal     Actual                      During Therapy                 Post-Therapy     % SpO2 HR RPD 1 - 4 RPE 6-20 Pain  1 - 10 % SpO2 HR RPD 1 - 4 RPE 6-20 Pain 0 - 10   Stretching/DB  /Monitoring               Stepper 25 min x L5 25 min x  95 78 2 13 1 96 80 2 14 1   IS 20 min x 2000 15 min x 1500 96 82 1 8 1 96 84 1 8 1   Arm Bike               Weighted DB                Arm Weights 25 min x 25 coy 20 min x 20 coy 95 92 1 12 1 96 90 1 12 1     Patient's Response to Therapy: Good effort and Good motivation. Time In:     Session Number:  Time Out:     O2 with therapy:  L/min         Individual Therapy               Goal     Actual                      During Therapy           Post Therapy     % SpO2 HR RPD 1 - 4 RPE 6-20 Pain  1 - 10 % SpO2 HR RPD 1 - 4 RPE 6-20 Pain 0 - 10   Stretching/DB  /Monitoring               Stat Bike               Stepper               Treadmill               Rest/PLB                   Assessment    Patient's response: Patient progressing towardsd LTGs evident by: Increased PLB and Increased Pacing.     Plan: Continue as written    Code     Billin units      Physician supervision provided this date of service by: Dr. Jeremy Jiménez     Therapist Signature:  GIANCARLO Churchill    Therapist PRINTED Name and Credential: GIANCARLO Churchill    3/28/2017  3:44 PM

## 2017-03-30 ENCOUNTER — APPOINTMENT (OUTPATIENT)
Dept: PULMONOLOGY | Age: 77
End: 2017-03-30
Payer: MEDICARE

## 2017-04-04 ENCOUNTER — APPOINTMENT (OUTPATIENT)
Dept: PULMONOLOGY | Age: 77
End: 2017-04-04
Payer: MEDICARE

## 2017-04-06 ENCOUNTER — APPOINTMENT (OUTPATIENT)
Dept: PULMONOLOGY | Age: 77
End: 2017-04-06
Payer: MEDICARE

## 2017-04-11 ENCOUNTER — APPOINTMENT (OUTPATIENT)
Dept: PULMONOLOGY | Age: 77
End: 2017-04-11
Payer: MEDICARE

## 2017-04-13 ENCOUNTER — APPOINTMENT (OUTPATIENT)
Dept: PULMONOLOGY | Age: 77
End: 2017-04-13
Payer: MEDICARE

## 2017-04-18 ENCOUNTER — HOSPITAL ENCOUNTER (OUTPATIENT)
Dept: PULMONOLOGY | Age: 77
Discharge: HOME | End: 2017-04-18
Payer: MEDICARE

## 2017-04-18 PROCEDURE — G0424 PULMONARY REHAB W EXER: HCPCS

## 2017-04-18 NOTE — PROGRESS NOTES
[facility]  Outpatient Pulmonary Rehabilitation Flowsheet  [address] [facility]  [place]    Rose [place]  1940       Patient's carry-over of treatment delivered by: none noted today. Objective Daily Findings      Respiratory Muscle Functioning/Exercise Conditioning/Strengthening Session:    Time In: 12:30  Time Out::1:30  Session Number: 6  O2 with Theapy: 0 L/min    Pre SPO2 95  Pre HR:88  Pre BP: 121/81    RR: 18 Wt: 146  Temp: not tested by patient   Post SPO2: 96 Post HR: 61  Post BP: 100/52    Self Care Home Management Instruction/Education    Self Care Home Management Instruction Education: Breathing Retaining and Exercise Concepts. Patient's Response to Education: Patient actively participated in education. Individual Therapy               Goal     Actual                      During Therapy                 Post-Therapy     % SpO2 HR RPD 1 - 4 RPE 6-20 Pain  1 - 10 % SpO2 HR RPD 1 - 4 RPE 6-20 Pain 0 - 10   Stretching/DB  /Monitoring 15 min  10 min 97 71 1 7 1 98 69 1 7 1   IS               Stepper 30 min x L5 25 min x L3 96 76 2 13 1 97 77 2 15 1   Arm Bike               Weighted DB                Arm Bike 25 min x 25 coy 25 min x 20 coy 95 90 1 12 1 96 91 1 13 1     Patient's Response to Therapy: Good effort and Good motivation. Time In:      Session Number:  Time Out:      O2 with therapy:  L/min         Individual Therapy               Goal     Actual                      During Therapy           Post Therapy     % SpO2 HR RPD 1 - 4 RPE 6-20 Pain  1 - 10 % SpO2 HR RPD 1 - 4 RPE 6-20 Pain 0 - 10   Stretching/DB  /Monitoring               Stat Bike               Stepper               Treadmill               Rest/PLB                 Assessment    Patient's response: Patient progressing towardsd LTGs evident by: Increased PLB and Increased Pacing.     Plan: Continue as written    Code     Billin units      Physician supervision provided this date of service by: Dr. Tiburcio Moreno     Therapist Signature:  GIANCARLO Monson    Therapist PRINTED Name and Credential: GIANCARLO Monson    4/18/2017  2:05 PM

## 2017-04-20 ENCOUNTER — HOSPITAL ENCOUNTER (OUTPATIENT)
Dept: PULMONOLOGY | Age: 77
Discharge: HOME | End: 2017-04-20
Payer: MEDICARE

## 2017-04-20 PROCEDURE — G0424 PULMONARY REHAB W EXER: HCPCS

## 2017-04-20 NOTE — PROGRESS NOTES
[facility]/\[facility]\""  Outpatient Pulmonary Rehabilitation Flowsheet  [address] [facility], [address] [place] [address]    [facility]  1940       Patient's carry-over of treatment delivered by: PLB when SOB. Objective Daily Findings      Respiratory Muscle Functioning/Exercise Conditioning/Strengthening Session:    Time In: 12:30  Time Out::1:30  Session Number: 7  O2 with Theapy: 0 L/min    Pre SPO2 95  Pre HR:91  Pre BP: 129/81    RR: 18 Wt: 147  Temp: not tested by patient   Post SPO2: 95 Post HR: 95  Post BP: 130/80    Self Care Home Management Instruction/Education    Self Care Home Management Instruction Education: Breathing Retaining. Patient's Response to Education: Patient actively participated in education. Comments: Individual Therapy               Goal     Actual                      During Therapy                 Post-Therapy     % SpO2 HR RPD 1 - 4 RPE 6-20 Pain  1 - 10 % SpO2 HR RPD 1 - 4 RPE 6-20 Pain 0 - 10   Stretching/DB  /Monitoring 10 min 10 min  96 72 1 10 1 96 75 1 10 1   IS               IMT               Arm Bike 30 min x 25 coy 25 min x 20 coy 96 81 1 8 1 95 85 2 8 1   Weighted DB                Stepper 30 min x L4 25 min x L4 97 82 2 12 1 96 94 2 13 1     Patient's Response to Therapy: Good effort and Good motivation. Comments:    Time In:      Session Number:  Time Out:      O2 with therapy:  L/min         Individual Therapy               Goal     Actual                      During Therapy           Post Therapy     % SpO2 HR RPD 1 - 4 RPE 6-20 Pain  1 - 10 % SpO2 HR RPD 1 - 4 RPE 6-20 Pain 0 - 10   Stretching/DB  /Monitoring               Stat Bike               Stepper               Treadmill               Rest/PLB                   Assessment    Patient's response: Patient progressing towardsd LTGs evident by: Increased PLB and Increased Pacing.     Plan: Continue as written    Code     Billin units      Physician supervision provided this date of service by: Dr. Patrick Gonzalez     Therapist Signature:  GIANCARLO Holcomb    Therapist PRINTED Name and Credential: GIANCARLO Holcomb    4/20/2017  4:04 PM

## 2017-04-25 ENCOUNTER — HOSPITAL ENCOUNTER (OUTPATIENT)
Dept: PULMONOLOGY | Age: 77
Discharge: HOME | End: 2017-04-25
Payer: MEDICARE

## 2017-04-25 ENCOUNTER — APPOINTMENT (OUTPATIENT)
Dept: PULMONOLOGY | Age: 77
End: 2017-04-25
Payer: MEDICARE

## 2017-04-25 PROCEDURE — G0424 PULMONARY REHAB W EXER: HCPCS

## 2017-04-25 NOTE — PROGRESS NOTES
[facility]  Outpatient Pulmonary Rehabilitation Flowsheet  [address] [facility]    Johnna Brochure  1940       Patient's carry-over of treatment delivered by: none noted today. Objective Daily Findings    Comments:    Respiratory Muscle Functioning/Exercise Conditioning/Strengthening Session:    Time In: 12:30  Time Out::1:30  Session Number: 8  O2 with Theapy: 0 L/min    Pre SPO2 94  Pre HR:96  Pre BP: 123/77    RR: 18 Wt: 140  Temp: not tested by patient   Post SPO2: 94 Post HR: 109  Post BP: 150/86    Self Care Home Management Instruction/Education    Self Care Home Management Instruction Education: Breathing Retaining and Exercise Concepts. Patient's Response to Education: Patient actively participated in education. Comments: Individual Therapy               Goal     Actual                      During Therapy                 Post-Therapy     % SpO2 HR RPD 1 - 4 RPE 6-20 Pain  1 - 10 % SpO2 HR RPD 1 - 4 RPE 6-20 Pain 0 - 10   Stretching/DB  /Monitoring 10 min 10 min 96 73 1 7 1 97 78 1 7 1   IS               IMT               Arm Bike 30 min x 25 coy 25 min x 20 coy 97 101 2 8 4 96 103 2 12 4   Stepper  30 min x L4 25 min x L4 97 93 2 13 6 96 97 2 14 5   Arm Weights                 Patient's Response to Therapy: Good effort and Good motivation. Comments:    Time In:     Session Number  Time Out:     O2 with therapy:  L/min         Individual Therapy               Goal     Actual                      During Therapy           Post Therapy     % SpO2 HR RPD 1 - 4 RPE 6-20 Pain  1 - 10 % SpO2 HR RPD 1 - 4 RPE 6-20 Pain 0 - 10   Stretching/DB  /Monitoring               Stat Bike               Stepper               Treadmill               Rest/PLB                     Assessment    Patient's response: Patient progressing towardsd LTGs evident by: Increased Pacing.     Plan: Continue as written    Code     Billin units      Physician supervision provided this date of service by: Dr. Augusto Keane     Therapist Signature:  GIANCARLO Myers    Therapist PRINTED Name and Credential: GIANCARLO Myers    4/25/2017  3:14 PM

## 2017-04-27 ENCOUNTER — APPOINTMENT (OUTPATIENT)
Dept: PULMONOLOGY | Age: 77
End: 2017-04-27
Payer: MEDICARE

## 2017-05-02 ENCOUNTER — HOSPITAL ENCOUNTER (OUTPATIENT)
Dept: PULMONOLOGY | Age: 77
Discharge: HOME | End: 2017-05-02
Payer: MEDICARE

## 2017-05-02 ENCOUNTER — APPOINTMENT (OUTPATIENT)
Dept: PULMONOLOGY | Age: 77
End: 2017-05-02
Payer: MEDICARE

## 2017-05-02 PROCEDURE — G0424 PULMONARY REHAB W EXER: HCPCS

## 2017-05-02 NOTE — PROGRESS NOTES
[facility]  Outpatient Pulmonary Rehabilitation Flowsheet  [address] [facility], [address] [place] [address]    Darrin Pendleton  1940       Patient's carry-over of treatment delivered by: none noted by patient. Objective Daily Findings      Respiratory Muscle Functioning/Exercise Conditioning/Strengthening Session:    Time In: 12:30  Time Out::1:30  Session Number: 9  O2 with Theapy: 0 L/min    Pre SPO2 97  Pre HR:100  Pre BP: 134/80    RR: 18 Wt: 147  Temp: not tested by patient   Post SPO2: 95 Post HR: 99  Post BP: 118/79    Self Care Home Management Instruction/Education    Self Care Home Management Instruction Education: Breathing Retaining. Patient's Response to Education: Patient actively participated in education. Individual Therapy               Goal     Actual                      During Therapy                 Post-Therapy     % SpO2 HR RPD 1 - 4 RPE 6-20 Pain  1 - 10 % SpO2 HR RPD 1 - 4 RPE 6-20 Pain 0 - 10   Stretching/DB  /Monitoring 10 min 10 min 97 81 1 7 1 97 69 1 7 1   IS               IMT               Arm Bike 30 min x 25 coy 25 min x 20 coy 97 91 2 11 1 97 93 2 12 1   Weighted DB                Stepper 30 min x L5 25 min x L4 97 103 2 13 1 98 106 2 14 1     Patient's Response to Therapy: Good effort and Good motivation. Time In:      Session Number:  Time Out:      O2 with therapy:  L/min         Individual Therapy               Goal     Actual                      During Therapy           Post Therapy     % SpO2 HR RPD 1 - 4 RPE 6-20 Pain  1 - 10 % SpO2 HR RPD 1 - 4 RPE 6-20 Pain 0 - 10   Stretching/DB  /Monitoring               Stat Bike               Stepper               Treadmill               Rest/PLB                    Patient's response to therapy: Increase fatique    Assessment    Patient's response: Patient progressing towardsd LTGs evident by: Increased PLB and Increased Pacing.     Plan: Continue as written    Code     Billin units      Physician supervision provided this date of service by: Dr. Tiburcio Moreno     Therapist Signature:  GIANCARLO Monson    Therapist PRINTED Name and Credential: GIANCARLO Monson    5/2/2017  3:59 PM

## 2017-05-04 ENCOUNTER — APPOINTMENT (OUTPATIENT)
Dept: PULMONOLOGY | Age: 77
End: 2017-05-04
Payer: MEDICARE

## 2017-05-09 ENCOUNTER — HOSPITAL ENCOUNTER (OUTPATIENT)
Dept: PULMONOLOGY | Age: 77
Discharge: HOME | End: 2017-05-09
Payer: MEDICARE

## 2017-05-09 ENCOUNTER — APPOINTMENT (OUTPATIENT)
Dept: PULMONOLOGY | Age: 77
End: 2017-05-09
Payer: MEDICARE

## 2017-05-09 PROCEDURE — G0424 PULMONARY REHAB W EXER: HCPCS

## 2017-05-11 ENCOUNTER — HOSPITAL ENCOUNTER (OUTPATIENT)
Dept: PULMONOLOGY | Age: 77
Discharge: HOME | End: 2017-05-11
Payer: MEDICARE

## 2017-05-11 ENCOUNTER — APPOINTMENT (OUTPATIENT)
Dept: PULMONOLOGY | Age: 77
End: 2017-05-11
Payer: MEDICARE

## 2017-05-11 PROCEDURE — G0424 PULMONARY REHAB W EXER: HCPCS

## 2017-05-16 ENCOUNTER — APPOINTMENT (OUTPATIENT)
Dept: PULMONOLOGY | Age: 77
End: 2017-05-16
Payer: MEDICARE

## 2017-05-16 ENCOUNTER — HOSPITAL ENCOUNTER (OUTPATIENT)
Dept: PULMONOLOGY | Age: 77
Discharge: HOME | End: 2017-05-16
Payer: MEDICARE

## 2017-05-16 PROCEDURE — G0424 PULMONARY REHAB W EXER: HCPCS

## 2017-05-16 NOTE — PROGRESS NOTES
[facility]  Outpatient Pulmonary Rehabilitation Flowsheet  [facility], [address] [place] [address] Ajay Quinteros, [address]    Dani Fabian  1940       Patient's carry-over of treatment delivered by: performs DB frequently. Objective Daily Findings    Respiratory Muscle Functioning/Exercise Conditioning/Strengthening Session:    Time In: 12:30  Time Out::1:30  Session Number: 12  O2 with Theapy: 0 L/min    Pre SPO2 95  Pre HR:93  Pre BP: 117/76    RR: 18 Wt: not tested   Temp: not tested    Post SPO2: 96 Post HR: 94  Post BP: 122/86    Self Care Home Management Instruction/Education    Self Care Home Management Instruction Education: Breathing Retaining and Exercise Concepts. Patient's Response to Education: Patient actively participated in education. C/o pain in arms on arm bike and quads on stepper. Individual Therapy               Goal     Actual                      During Therapy                 Post-Therapy     % SpO2 HR RPD 1 - 4 RPE 6-20 Pain  1 - 10 % SpO2 HR RPD 1 - 4 RPE 6-20 Pain 0 - 10   Stretching/DB  /Monitoring               IS 20 min x 1750 15 min x 1750 96 73 1 7 1 97 75 1 8 1   IMT               Arm Bike 30 min x 25 coy 25 min x 20 coy 95 84 2 13 4 96 86 2 14 4   Stepper  25 min x L5 20 min x L5 97 93 3 14 6 96 84 3 15 6   Arm Weights                 Patient's Response to Therapy: Good effort and Good motivation. Time In:      Session Number:  Time Out:      O2 with therapy:  L/min         Individual Therapy               Goal     Actual                      During Therapy           Post Therapy     % SpO2 HR RPD 1 - 4 RPE 6-20 Pain  1 - 10 % SpO2 HR RPD 1 - 4 RPE 6-20 Pain 0 - 10   Stretching/DB  /Monitoring               Stat Bike               Stepper               Treadmill               Rest/PLB                   Assessment    Patient's response: Patient progressing towardsd LTGs evident by: Increased PLB and Increased Pacing.     Plan: Continue as written    Code     Billin units      Physician supervision provided this date of service by: Dr. Chacorta Carranza     Therapist Signature:  BRODY Zapata/STEPHEN    Therapist PRINTED Name and Credential: GIANCARLO Zapata    2017  3:46 PM

## 2017-05-18 ENCOUNTER — APPOINTMENT (OUTPATIENT)
Dept: PULMONOLOGY | Age: 77
End: 2017-05-18
Payer: MEDICARE

## 2017-05-22 NOTE — PROGRESS NOTES
[facility]  Pulmonary Rehabailition  Patient Re-certifications           Exercise (required)     90-Day Re-Assessment/POC    Date/Time: 5/22/17 10:00 AM    Initials:  [address]   120-Day Re-Assessment/POC    Date/Time: 6/22/17 1:00 PM    Initials:  [address]  150-Day Re-Assessment/POC    Date/Time: 7/25/17  9:00 AM  Initials:  [address] [facility]       *Please see recent flow sheets    STG Status:  Compliant with Exercise Prescription  [x] Yes   [] No    If no, describe:    Participation in educational sessions  [x] Yes   [] No    If no, describe: Other:     Updated Exercise Prescription  Aerobic Mode:   [x]  Treadmill  [x] Bike  [x] NuStep  [x]  Arm Ergometer  [x]  Other:    Frequency: 2 days/week  Duration: Up to 40   Min per mode      Intensity: RPE  6  To  15   ; RPD < 2    Strength training Mode:  [x]  Free Weights/Thera-bands  Frequency: 2-3 days/week  Duration: Up to 35 min  Intensity: 1-20 sets; 5 - 30 reps;  RPE:  6   to 15. Breathing Exercise Mode:  [x]  IMT/PEP  [x] Weight Diaphragm Breathing  [x] Incentive Spirometer    Frequency: 2 days/week  Duration: Up to 1-30  Min per mode  Intensity: RPE:6 to 15. Maria Victoria Sprung LTG (to be achieved by discahrge)  [x] Exercise > 95  Min/week with O2 Sat > 89   , RPD <  2   ,  [x] Increase 6MW test by 50  Feet. *Please see recent flow sheets    STG Status:  Compliant with Exercise Prescription  [x] Yes   [] No    If no, describe:    Participation in educational sessions  [x] Yes   [] No    If no, describe: Other:     Updated Exercise Prescription  Aerobic Mode:   [x]  Treadmill  [x] Bike  [x] NuStep  [x]  Arm Ergometer  []  Other:    Frequency: 2 days/week  Duration: Up to 40  Min per mode      Intensity: RPE  6  To  15   ; RPD < 2    Strength training Mode:  [x]  Free Weights/Thera-bands  Frequency: 2 days/week  Duration: Up to 35  min  Intensity: 1-20 sets; 5-30 reps;  RPE: 6    to 15.       Breathing Exercise Mode:  [x]  IMT/PEP  [x] Weight Diaphragm Breathing  [x] Incentive Spirometer    Frequency: 2 days/week  Duration: Up to30   Min per mode  Intensity: RPE:  6 to 15      LTG (to be achieved by discahrge)  [x] Exercise >95   Min/week  with O2 Sat >89    , RPD < 2    ,  [x] Increase 6MW test by 50  Feet. *Please see recent flow sheets    STG Status:  Compliant with Exercise Prescription  [x] Yes   [] No    If no, describe:    Participation in educational sessions  [x] Yes   [] No    If no, describe: Other:     Updated Exercise Prescription  Aerobic Mode:   [x]  Treadmill  [x] Bike  [x] NuStep  [x]  Arm Ergometer  []  Other:        Frequency: 2 days/week  Duration: Up to 40  Min per mode. Intensity: RPE 6   To 15    ; RPD < 2. Strength training Mode:  [x]  Free Weights/Thera-bands  Frequency: 2 days/week  Duration: Up to 35  min  Intensity: 1-10 sets; 5-30 reps;  RPE: 6    to 15. RPD less than or equal to 2. Breathing Exercise Mode:  [x]  IMT/PEP  [x] Weight Diaphragm Breathing  [x] Incentive Spirometer    Frequency: 2 days/week  Duration: Up to 30  Min per mode  Intensity: RPE: 6 to 15. RPD less than or equal to 2. LTG (to be achieved by discahrge)  [x] Exercise >95   Min with O2 Sat >89    , RPD < 2    ,  [x] Increase 6MW test by 50    Feet. Nutrition (required)           90 Day Re-assessment/POC    Date/Time: 5/22/17 10:00 AM  Initial: [address] 120 Day Re-assessment/POC    Date/Time: 6/22/17 1:00 PM  Initial: [address]   150 Day Re-assessment/POC    Date/Time:  7/25/17  9:00 AM  Initial:  [address]        *Please see recent flow sheets  STG Status:  Compliant with prescribed, specialized diet      % of the time per patient report. Participate in nutritional health classes   [x]  Yes   []  No          If no, describe:      Current RYP score: eval 60.            Other: Describe:     Dietician Consult Completed       []  Yes   []  No   []  N/A      UPOC:  Pt will continue to:   []  Be compliant with prescribed, specialized diet    [x] Participate in nutritional     health classes. [] Improve RYP score to:      LTG (to be achieved by D/C):     [x] Patient will verbalize an understanding of a life-long adherence to a healthy diet and the impact on health condition(s). *Please see recent flow sheets  STG Status:  Compliant with prescribed, specialized diet      % of the time per patient report. Participate in nutritional health classes   [x]  Yes   []  No          If no, describe:      Current RYP score: Other: Describe:     Dietician Consult Completed   []  Yes   []  No   []  N/A    UPOC:  Pt will continue to:   []  Be compliant with prescribed, specialized diet    [x] Participate in nutritional     health classes. [] Improve RYP score to:    LTG (to be achieved by D/C):     [x] Patient will verbalize an understanding of a life-long adherence to a healthy diet and the impact on health condition(s). *Please see recent flow sheets  STG Status:  Compliant with prescribed, specialized diet    % of the time per patient report. Participate in nutritional health classes. [x] Yes [] No          If no, describe:      Current RYP score: Other: Describe:     Dietician Consult Completed    [] Yes   [] No    []N/A      UPOC:  Pt will continue to:   [x]  Be compliant with prescribed, specialized diet. [x]  Participate in nutritional health classes. [x] Be compliant with healthy diet as evidenced of RYP score increasing to:    LTG (to be achieved by D/C):     [x] Patient will verbalize an understanding of a life-long adherence to a healthy diet and the impact on health condition(s).          Psychosocial (required)        90 - Day Re-Assessment/POC    Date/Time:5/22/17 10:00 AM  Initials: [address] 120-Day Re-Assessment/POC      Date/Time: 6/22/17 1:00 PM  Initials: [address] 150-Day Re-Assessment/POC    Date/Time: 7/25/17 9:00 AM  Initials: [address]                 *Please see recent flow sheets    STG Status:    Manage and reduce stress per patient report.   []  Yes   []  No          If no, describe:    PHQ 9 current score is: eval score 1. Average sleep hours:     Participate in emotional health class. [x]  Yes   []  No          If no, describe:        Decreased smoking by:   0  %      UPOC:  Pt will continue to:   [x]  Manage and reduce stress per patient report. []  Improve PHQ-9 score to:    []  Manage sleep as evidenced by average sleep hours of:        [x] Participate in emotional health class. [x]  Decrease smoking by  25  %     Other:      LTG (to be achieved by D/C):  [x]  Use relaxation techniques when stressors are identified and verbalize coping skills and decrease vulnerability to stress. [x]  Maintain decreased smoking behaviors/smoking cessation. []  Improve PHQ-9 score  [x] Improve [employer] scores and maintain a healthy psychosocial well-being. *Please see recent flow sheets    STG Status:  Manage and reduce stress per patient report.   []  Yes   []  No          If no, describe:    PHQ 9 current score is:      Average sleep hours:     Participate in emotional health class. [x]  Yes   []  No          If no, describe:            Decreased smoking by: 20    %    UPOC:  Pt will continue to:   [x]  Manage and reduce stress per patient report. []  Improve PHQ-9 score to:    []  Manage sleep as evidenced by average sleep hours of:        [x] Participate in emotional health class. [x]  Decrease smoking by 20    %  Other:      LTG (to be achieved by D/C):  [x]  Use relaxation techniques when stressors are identified and verbalize coping skills and decrease vulnerability to stress. [x]  Maintain decreased smoking behaviors/smoking cessation. []  Improve PHQ-9 score  [x] Improve [employer] scores and maintain a healthy psychosocial well-con. *Please see recent flow sheets STG Status:    Manage and reduce stress per patient report.     []Yes    [] No          If no, describe:     PHQ 9 current score is:      Average sleep hours:      Participate in emotional health class. [x]  Yes []  No          If no, describe:         Decreased smoking by:  %    UPOC:  Pt will continue to:   [x]  Manage and reduce stress per patient report. [] Improve PHQ-9 score to:  []  Manage sleep as evidenced by average sleep hours of:      [x]  Participate in emotional health class. [x]  Decrease smoking by 20 %    Other:      LTG (to be achieved by D/C):  [x]  Use relaxation techniques when stressors are identified and verbalize coping skills and decrease vulnerability to stress. [x]  Maintain decreased smoking behaviors/smoking cessation. []  Improve PHQ-9 score  [x] [facility] scores and maintain a healthy psychosocial well-being. Oxygen (required)     90-Day Re-Assessment/POC    Date/Time: 5/22/17 10:00 AM  Initials: [address] 120-Day Re-Assessment/POC    Date/Time: 6/22/17 1:00 PM  Initials: [address]  150-Day Re-Assessment/POC    Date/Time: 7/25/17 9:00 AM    Initials: [address]                   *Please see recent flow sheets  Changes in Prescription:   []  Yes  []  No    [x] N/A    If yes, describe:    STG Status:  []  Compliant with Oxygen prescription. [x]  Maintain oxygen saturations >89     at rest.     [x]   Maintain Oxygen saturations >89      with exertion. [x]  Utilize  PLB    50 % of the time without prompting. [x]  Utilize DB    50 % of the time without prompting. [] Other: Describe:      UPOC:  Pt will continue to:   [] Be compliant with Oxygen prescription. [x]  Maintain Oxygen saturations > 89      at rest.  [x]  Maintain Oxygen saturations >89      with exertion. [x] Utilize PLB    60 % of the time without prompting. [x]  Utilize DB  60 % of the time without prompting.   []  Other:     LTG (to be achieved by D/C):    [x] Patient will independently perform pursed-lip breathing  [x] Patient will independently perform diaphragmatic breathing  [] Patient will follow Oxygen prescription *Please see recent flow sheets  Changes in Prescription:   []  Yes  []  No    [x] N/A    If yes, describe:    STG Status:  []  Compliant with Oxygen prescription. [x]  Maintain oxygen saturations > 89    at rest.     [x]   Maintain Oxygen saturations >89      with exertion. [x]  Utilize  PLB   60  % of the time without prompting. [x]  Utilize DB   60  % of the time without prompting. [] Other: Describe:        UPOC:  Pt will continue to:   [] Be compliant with Oxygen prescription. [x]  Maintain Oxygen saturations >89       at rest.  [x]  Maintain Oxygen saturations >89      with exertion. [x] Utilize PLB  65   % of the time without prompting. [x]  Utilize DB    65% of the time without prompting. []  Other:     LTG (to be achieved by D/C):    [x] Patient will independently perform pursed-lip breathing  [x] Patient will independently perform diaphragmatic breathing  [] Patient will follow Oxygen prescription *Please see recent flow sheets  Changes in Prescription:   []  Yes  []  No    [x] N/A    If yes, describe:      STG Status:  []  Compliant with Oxygen prescription. [x]  Maintain Oxygen saturations >89   at rest.     [x]  Maintain Oxygen saturations >89  with exertion. [x]  Utilize PLB       75 % of the time without prompting. [x] Utilize DB    75  % of the time without prompting. UPOC:  Pt will continue to:   [] Be compliant with Oxygen prescription. [x]  Maintain Oxygen saturations > 89      at rest.  [x]  Maintain Oxygen saturations > 89     with exertion. [x] Utilize PLB   80  % of the time without prompting. [x]  Utilize DB  80  % of the time without prompting.   []  Other:      LTG (to be achieved by D/C):    [x] Patient will independently perform pursed-lip breathing  [x] Patient will independently perform diaphragmatic breathing  [] Patient will follow Oxygen prescription       Core Measures Congestive Heart Failure     90-Day Re-Assessment/POC    Date/Time: 5/22/17 10:00 AM  Initials: [address] 120-Day Re-Assessment/POC    Date/Time:6/22/17 1:00 PM  Initials: [address]  150-Day Re-Assessment/POC    Date/Time: 7/25/17 9:00 AM  Initials: [address]     [x]  Not a Risk Factor at This Time. No plan of care required. *Please see recent flow sheets   Newly diagnosed:   []  Yes  []  No    [] N/A   If yes, describe date of onset, class and medication changes:      STG Status/UPOC:  Per patient report: Identifies own individual Baselines  (see left):  []  Yes  []  No, continue      Understands and able to act upon symptoms of worsening CHF:   []  Yes   []  No, continue    Weighs self daily:   [] Yes   []  No, continue      Monitors BP at home:   [] Yes   []  No, continue      Compliant with medications:   [] Yes   []  No, continue      Compliant w/ diet and sodium intake:   [] Yes   []  No, continue      Monitors home fluidintake:   [] Yes   []  No, continue      Exercises daily:  [] Yes   []  No, continue      [] Other:        LTG (to be achieved by D/C):  [] Able to verbalize individual baselines, signs and symptoms of worsening glynn failure and appropriate self-management   [] Compliant with sodium intake and fluid intake  [] Compliant with medications  [] Weighs self daily  [] Monitors blood pressure at home      *Please see recent flow sheets   Newly diagnosed:   []  Yes  []  No    [] N/A   If yes, describe date of onset, class and medication changes:      STG Status/UPOC:  Per patient report:   Identifies own individual Baselines  (see left):  []  Yes  []  No, continue      Understands and able to act upon symptoms of worsening CHF:   []  Yes   []  No, continue    Weighs self daily:   [] Yes   []  No, continue      Monitors BP at home:   [] Yes   []  No, continue    Compliant with medications:   [] Yes   []  No, continue      Compliant w/ diet and sodium intake:   [] Yes   []  No, continue      Monitors home fluid intake:   [] Yes   []  No, continue      Exercises daily:  [] Yes   []  No, continue      [] Other:                                            LTG (to be achieved by D/C):  [] Able to verbalize individual baselines, signs and symptoms of worsening glynn failure and appropriate self-management   [] Compliant with sodium intake and fluid intake  [] Compliant with medications  [] Weighs self daily  [] Monitors blood pressure at home   *Please see recent flow sheets  Newly diagnosed:   []  Yes  []  No    [] N/A    If yes, describe date of onset, class and medication changes:     STG Status/UPOC:  Per patient report: Identifies own individual Baselines  (see left):  []  Yes  []  No, continue      Understands and able to act upon symptoms of worsening CHF:   []  Yes   []  No, continue      Weighs self daily:   [] Yes   []  No, continue      Monitors BP at home:   [] Yes   []  No, continue      Compliant with medications:   [] Yes   []  No, continue      Compliant w/ diet and sodium intake:   [] Yes   []  No, continue      Monitors home fluid intake:   [] Yes   []  No, continue      Exercises daily:  [] Yes   []  No, continue      [] Other:        LTG (to be achieved by D/C):  [] Able to verbalize individual baselines, signs and symptoms of worsening glynn failure and appropriate self-management   [] Compliant with sodium intake and fluid intake  [] Compliant with medications  [] Weighs self daily  [] Monitors blood pressure at home         Core Measures Hypertention   90-Day Re-Assessment/POC    Date/Time:5/22/17 10:00 AM  Initials:[address] 120-Day Re-Assessment/POC    Date/Time: 6/22/17 1:00 PM  Initials: [address] 150-Day Re-Assessment/POC    Date/Time:    Initials:     []  Not a Risk Factor at This Time. No plan of care required.   *Please see recent flow sheets  Newly diagnosed:   []  Yes  [x]  No    [] N/A     If yes, describe date of onset and medication changes:    STG Status/UPOC:  Per patient report    BP Medication compliance:  [] Yes   []  No, continue    Low Na+ diet:  [] Yes   []  No, continue    Monitors BP at home:  [x] Yes   []  No, continue    Resting BP (obtained by clinician):  [] Yes   []  No, continue    Other:           LTG (to be achieved by D/C):  [x] Consistent resting blood pressure <140/90[x] Monitors BP at home met 4/18/17. [] BP medication compliance *Please see recent flow sheets  Newly diagnosed:   []  Yes  [x]  No    [] N/A     If yes, describe date of onset and medication changes:    STG Status/UPOC:  Per patient report    BP Medication compliance:  [] Yes   []  No, continue    Low Na+ diet:  [] Yes   []  No, continue    Monitors BP at home:  [] Yes   []  No, continue    Resting BP (obtained by clinician):  [] Yes   []  No, continue    Other:  *Please see recent flow sheets  Newly diagnosed:   []  Yes  [x]  No    [] N/A     If yes, describe date of onset and medication changes:    STG Status/UPOC:  Per patient report    BP Medication compliance:   [] Yes   []  No, continue    Low Na+ diet:  [] Yes   []  No, continue    Monitors BP at home:  [] Yes   []  No, continue    Resting BP (obtained by clinician):  [] Yes   []  No, continue    Other:          LTG (to be achieved by D/C):  [x] Consistent resting blood pressure <140/90[x] Monitors BP at home met 4/18/17  [] BP medication compliance   LTG (to be achieved by D/C):  [x] Consistent resting blood pressure <140/90  [x] Monitors BP at home  [] BP medication compliance       Core Measures Diabetes     90-Day Re-Assessment/POC    Date/Time:5/22/17 10:00 AM  Initials: [address] 120-Day Re-Assessment/POC    Date/Time: 6/22/17 1:00 [address]  Initials: 150-Day Re-Assessment/POC    Date/Time 7/25/17 9:00 AM    Initials: [address]       [x]  Not a Risk Factor at This Time. No plan of care required.   *Please see recent flow sheets  Newly diagnosed:   []  Yes  []  No    [] N/A     If yes, describe date of onset and medication changes:    STG Status/UPOC:  Per patient report    Compliant with diabetic medication: [] Yes   []  No, continue    Compliant with diabetic diet:  [] Yes   []  No, continue    Monitors blood sugar at home:  [] Yes   []  No, continue    Takes appropriate corrective actions when blood glucose is out of range:  [] Yes   []  No, continue    Other:           LTG (to be achieved by D/C):    [] Blood sugar <     And >    For exercise  [] Patient is medication compliant  [] Patient controls blood sugar via diet  [] Blood sugar <    And >    For fasting blood sugar     *Please see recent flow sheets  Newly diagnosed:   []  Yes  []  No    [] N/A     If yes, describe date of onset and medication changes:    STG Status/UPOC:  Per patient report    Compliant with diabetic medication:   [] Yes   []  No, continue    Compliant with diabetic diet:  [] Yes   []  No, continue    Monitors blood sugar at home:  [] Yes   []  No, continue    Takes appropriate corrective actions when blood glucose is out of range:  [] Yes   []  No, continue    Other:        *Please see recent flow sheets  Newly diagnosed:   []  Yes  []  No    [] N/A     If yes, describe date of onset and medication changes:    STG Status/UPOC:  Per patient report    Compliant with diabetic medication:  [] Yes   []  No, continue      Compliant with diabetic diet:  [] Yes   []  No, continue    Monitors blood sugar at home:  [] Yes   []  No, continue    Takes appropriate corrective actions when blood glucose is out of range:  [] Yes   []  No, continue    Other:          LTG (to be achieved by D/C):    [] Blood sugar <     And >    For exercise  [] Patient is medication compliant  [] Patient controls blood sugar via diet  [] Blood sugar <    And >    For fasting blood sugar       LTG (to be achieved by D/C):    [] Blood sugar <     And >    For exercise  [] Patient is medication compliant  [] Patient controls blood sugar via diet  [] Blood sugar <    And >    For fasting blood sugar                   Patient Goal(s):   Johnna Salas was an active participant in the development of this ITP and is agreeable to treatment within an open gym environment. Supervising Physician: Dr. Trenton Mclaughlin, OTR/L  5/22/2017,9:36 AM    Medical Director ITP and Exercise Prescription Approval  Pulmonary Rehabilitation    I    90-Day Reassessment:  Patient's functional level; progress towards goals; carry-over learning within the home; problems, concerns and/or deficits; treatment plan (treatment modalities); and patient goals were reviewed with the patient. Additional Physician findings and/or comments (if applicable). The Medical Directors electronic co-signature validates that provider has completed the direct contact and concur with these findings with any changes and/or additional comments noted below. I certify the need for Pulmonary Rehabilitation therapy furnished under this plan of treatment and while under my care. I agree with the plan and certify that this therapy is necessary. Certification Period: 5/25/17 to 6/23/17      120- Day Reassessment:  Patient's functional level; progress towards goals; carry-over learning within the home; problems, concerns and/or deficits; treatment plan (treatment modalities); and patient goals were reviewed with the patient. Additional Physician findings and/or comments (if applicable). The Medical Directors electronic co-signature validates that provider has completed the direct contact and concur with these findings with any changes and/or additional comments noted below. I certify the need for Pulmonary Rehabilitation therapy furnished under this plan of treatment and while under my care. I agree with the plan and certify that this therapy is necessary.     Certification Period: 6/27/17 to 7/26/17    150-Day Reassessment:  Patient's functional level; progress towards goals; carry-over learning within the home; problems, concerns and/or deficits; treatment plan (treatment modalities); and patient goals were reviewed with the patient. Additional Physician findings and/or comments (if applicable). The Medical Directors electronic co-signature validates that provider has completed the direct contact and concur with these findings with any changes and/or additional comments noted below. I certify the need for Pulmonary Rehabilitation therapy furnished under this plan of treatment and while under my care. I agree with the plan and certify that this therapy is necessary.     Certification Period: 7/27/17 to 8/25/17

## 2017-05-23 ENCOUNTER — APPOINTMENT (OUTPATIENT)
Dept: PULMONOLOGY | Age: 77
End: 2017-05-23
Payer: MEDICARE

## 2017-05-25 ENCOUNTER — APPOINTMENT (OUTPATIENT)
Dept: PULMONOLOGY | Age: 77
End: 2017-05-25
Payer: MEDICARE

## 2017-05-25 ENCOUNTER — HOSPITAL ENCOUNTER (OUTPATIENT)
Dept: PULMONOLOGY | Age: 77
Discharge: HOME | End: 2017-05-25
Payer: MEDICARE

## 2017-05-25 PROCEDURE — G0424 PULMONARY REHAB W EXER: HCPCS

## 2017-05-25 NOTE — PROGRESS NOTES
[facility]  Outpatient Pulmonary Rehabilitation Flowsheet  [address], [address], [address]    Rose Santos  1940       Patient's carry-over of treatment delivered by: performs PLB and DB together. Objective Daily Findings      Respiratory Muscle Functioning/Exercise Conditioning/Strengthening Session:    Time In: 12:30  Time Out::1:30  Session Number: 13  O2 with Theapy: 0 L/min    Pre SPO2 95  Pre HR:97  Pre BP: 119/78    RR: 18 Wt: 148  Temp: not tested by patient   Post SPO2: 96 Post HR: 87  Post BP: 128/78    Self Care Home Management Instruction/Education    Self Care Home Management Instruction Education: Breathing Retaining and Exercise Concepts. Patient's Response to Education: Patient actively participated in education. Individual Therapy               Goal     Actual                      During Therapy                 Post-Therapy     % SpO2 HR RPD 1 - 4 RPE 6-20 Pain  1 - 10 % SpO2 HR RPD 1 - 4 RPE 6-20 Pain 0 - 10   Stretching/DB  /Monitoring 10 min 10 min 96 75 1 7 1 97 72 1 7 1   IS               Stepper 30 min x L5 25 min x L5 97 83 2 12 1 96 95 2 13 4   Arm Bike 30 min x 25 coy 25 min x 25 coy 96 82 3 13 1 95 85 3 14 5   Weighted DB                Arm Weights                 Patient's Response to Therapy: Good effort and Good motivation. C/o pain in thighs on stepper and upper arms on arm bike.   Comments:    Time In:      Session Number:  Time Out:      O2 with therapy:  L/min         Individual Therapy               Goal     Actual                      During Therapy           Post Therapy     % SpO2 HR RPD 1 - 4 RPE 6-20 Pain  1 - 10 % SpO2 HR RPD 1 - 4 RPE 6-20 Pain 0 - 10   Stretching/DB  /Monitoring               Stat Bike               Stepper               Treadmill               Rest/PLB                    Patient's response to therapy: Good effort      Assessment    Patient's response: Patient progressing towardsd LTGs evident by: Increased PLB and Increased Pacing. Plan: Continue as written    Code     Billin units      Physician supervision provided this date of service by: Dr. Augusto Keane     Therapist Signature:  GIANCARLO Myers    Therapist PRINTED Name and Credential: GIANCARLO Myers    2017  4:09 PM

## 2017-05-30 ENCOUNTER — HOSPITAL ENCOUNTER (OUTPATIENT)
Dept: PULMONOLOGY | Age: 77
Discharge: HOME | End: 2017-05-30
Payer: MEDICARE

## 2017-05-30 ENCOUNTER — APPOINTMENT (OUTPATIENT)
Dept: PULMONOLOGY | Age: 77
End: 2017-05-30
Payer: MEDICARE

## 2017-05-30 PROCEDURE — G0424 PULMONARY REHAB W EXER: HCPCS

## 2017-06-01 ENCOUNTER — APPOINTMENT (OUTPATIENT)
Dept: PULMONOLOGY | Age: 77
End: 2017-06-01
Payer: MEDICARE

## 2017-06-06 ENCOUNTER — APPOINTMENT (OUTPATIENT)
Dept: PULMONOLOGY | Age: 77
End: 2017-06-06
Payer: MEDICARE

## 2017-06-08 ENCOUNTER — APPOINTMENT (OUTPATIENT)
Dept: PULMONOLOGY | Age: 77
End: 2017-06-08
Payer: MEDICARE

## 2017-06-13 ENCOUNTER — APPOINTMENT (OUTPATIENT)
Dept: PULMONOLOGY | Age: 77
End: 2017-06-13
Payer: MEDICARE

## 2017-06-13 ENCOUNTER — HOSPITAL ENCOUNTER (OUTPATIENT)
Dept: PULMONOLOGY | Age: 77
Discharge: HOME | End: 2017-06-13
Payer: MEDICARE

## 2017-06-13 PROCEDURE — G0424 PULMONARY REHAB W EXER: HCPCS

## 2017-06-13 NOTE — PROGRESS NOTES
[facility]  Outpatient Pulmonary Rehabilitation Flowsheet  [facility], [address] [place] [address] Ajay Quinteros, [address]    Allyson Balloon  1940       Patient's carry-over of treatment delivered by: paces walking and other activities. Objective Daily Findings    Respiratory Muscle Functioning/Exercise Conditioning/Strengthening Session:    Time In: 12:30  Time Out::1:30  Session Number: 15  O2 with Theapy: 0 L/min    Pre SPO2 95  Pre HR:88  Pre BP: 124/84    RR: 18 Wt: 148  Temp: not tested by patient   Post SPO2: 98 Post HR: 87  Post BP: 130/82    Self Care Home Management Instruction/Education    Self Care Home Management Instruction Education: Breathing Retaining and Exercise Concepts. Patient's Response to Education: Patient actively participated in education. Individual Therapy               Goal     Actual                      During Therapy                 Post-Therapy     % SpO2 HR RPD 1 - 4 RPE 6-20 Pain  1 - 10 % SpO2 HR RPD 1 - 4 RPE 6-20 Pain 0 - 10   Stretching/DB  /Monitoring 10 min 10 min 96 76 1 7 1 96 83 1 8 1   IS               Stepper 30 min x L5 25 min x L4 96 88 2 11 6 94 92 2 12 6   Arm Bike 30 min x 25 coy 25 min x 20 coy 97 100 2 13 1 97 103 3 14 1   Weighted DB                Arm Weights                 Patient's Response to Therapy: Good effort and Good motivation. Comments:    Time In:      Session Number:  Time Out:      O2 with therapy:  L/min         Individual Therapy               Goal     Actual                      During Therapy           Post Therapy     % SpO2 HR RPD 1 - 4 RPE 6-20 Pain  1 - 10 % SpO2 HR RPD 1 - 4 RPE 6-20 Pain 0 - 10   Stretching/DB  /Monitoring               Stat Bike               Stepper               Treadmill               Rest/PLB                    Patient's response to therapy: good motivation, performs more PLB with exercise.       Assessment    Patient's response: Patient progressing towardsd LTGs evident by:  Decreased María, Increased PLB and Increased Pacing. Plan: Continue as written    Code     Billin units      Physician supervision provided this date of service by: Dr. Irasema Taylor     Therapist Signature:  GIANCARLO Norwood    Therapist PRINTED Name and Credential: GIANCARLO Norwood    2017  3:28 PM

## 2017-06-15 ENCOUNTER — APPOINTMENT (OUTPATIENT)
Dept: PULMONOLOGY | Age: 77
End: 2017-06-15
Payer: MEDICARE

## 2017-06-20 ENCOUNTER — APPOINTMENT (OUTPATIENT)
Dept: PULMONOLOGY | Age: 77
End: 2017-06-20
Payer: MEDICARE

## 2017-06-22 ENCOUNTER — HOSPITAL ENCOUNTER (OUTPATIENT)
Dept: PULMONOLOGY | Age: 77
Discharge: HOME | End: 2017-06-22
Payer: MEDICARE

## 2017-06-22 ENCOUNTER — APPOINTMENT (OUTPATIENT)
Dept: PULMONOLOGY | Age: 77
End: 2017-06-22
Payer: MEDICARE

## 2017-06-22 PROCEDURE — G0424 PULMONARY REHAB W EXER: HCPCS

## 2017-06-22 NOTE — PROGRESS NOTES
[facility]  Outpatient Pulmonary Rehabilitation Flows[address] Ajay Quinteros, [address]    Jose Manuel Iverson  1940       Patient's carry-over of treatment delivered by: uses PLB with minimal cues. Objective Daily Findings      Respiratory Muscle Functioning/Exercise Conditioning/Strengthening Session:    Time In: 12:30  Time Out::1:30  Session Number: 16  O2 with Theapy: 0 L/min    Pre SPO2 97  Pre HR:90  Pre BP: 142/85    RR: 18 Wt: not tested by patient   Temp: not tested by patient   Post SPO2: 96 Post HR: 84  Post BP: 121/70    Self Care Home Management Instruction/Education    Self Care Home Management Instruction Education: Breathing Retaining and Exercise Concepts. Patient's Response to Education: Patient actively participated in education. Individual Therapy               Goal     Actual                      During Therapy                 Post-Therapy     % SpO2 HR RPD 1 - 4 RPE 6-20 Pain  1 - 10 % SpO2 HR RPD 1 - 4 RPE 6-20 Pain 0 - 10   Stretching/DB  /Monitoring 10 min 10 min 97 77 1 8 1 96 80 1 8 1   Stepper 25 min x L5 25 min x L4 95 86 2 14 3 96 89 2 14 3   IS               Arm Bike 30 min x 25 coy 25 min x 20 coy 96 90 2 12 1 97 93 2 11 1   Weighted DB                Arm Weights                 Patient's Response to Therapy: Good effort and Good motivation. Time In:      Session Number:  Time Out:      O2 with therapy:  L/min         Individual Therapy               Goal     Actual                      During Therapy           Post Therapy     % SpO2 HR RPD 1 - 4 RPE 6-20 Pain  1 - 10 % SpO2 HR RPD 1 - 4 RPE 6-20 Pain 0 - 10   Stretching/DB  /Monitoring               Stat Bike               Stepper               Treadmill               Rest/PLB                    Patient's response to therapy: Good effort and Good motivation     Assessment    Patient's response: Patient progressing towardsd LTGs evident by:   Increased PLB and Increased Pacing. Plan: Continue as written    Code     Billin units      Physician supervision provided this date of service by: Dr. Hubert Correa     Therapist Signature:  GIANCARLO Faye    Therapist PRINTED Name and Credential: GIANCARLO Faye    2017  2:57 PM

## 2017-06-27 ENCOUNTER — HOSPITAL ENCOUNTER (OUTPATIENT)
Dept: PULMONOLOGY | Age: 77
Discharge: HOME | End: 2017-06-27
Payer: MEDICARE

## 2017-06-27 ENCOUNTER — APPOINTMENT (OUTPATIENT)
Dept: PULMONOLOGY | Age: 77
End: 2017-06-27
Payer: MEDICARE

## 2017-06-27 PROCEDURE — G0424 PULMONARY REHAB W EXER: HCPCS

## 2017-06-27 NOTE — PROGRESS NOTES
[facility]  Outpatient Pulmonary Rehabilitation Flowsheet  [facility], [address] [place], [place] [address]mohinder Quinteros, [address]    Hilary Osuna  1940       Patient's carry-over of treatment delivered by: takes rest breaks when working in the yard. Objective Daily Findings      Respiratory Muscle Functioning/Exercise Conditioning/Strengthening Session:    Time In: 12:30  Time Out::1:30  Session Number: 17  O2 with Theapy: 0 L/min    Pre SPO2 67  Pre HR:89  Pre BP: 117/73    RR: 18 Wt: not tested by patient  Temp: not tested by patient   Post SPO2: 99 Post HR: 74  Post BP: 105/74    Self Care Home Management Instruction/Education    Self Care Home Management Instruction Education: Breathing Retaining. Patient's Response to Education: Patient actively participated in education. Comments: Individual Therapy               Goal     Actual                      During Therapy                 Post-Therapy     % SpO2 HR RPD 1 - 4 RPE 6-20 Pain  1 - 10 % SpO2 HR RPD 1 - 4 RPE 6-20 Pain 0 - 10   Stretching/DB  /Monitoring               IS 25 min x 2000 25 min x 1800 97 75 1 7 1 97 77 1 8 1   Stepper 30 min x L5 35 min x L4 96 87 1 10 4 96 90 1 12 4   Arm Bike               Weighted DB                Arm Weights                 Patient's Response to Therapy: Good effort and Good motivation. Comments:    Time In: 1:30     Session Number:18  Time Out: 2:05     O2 with therapy: 0 L/min         Individual Therapy               Goal     Actual                      During Therapy           Post Therapy     % SpO2 HR RPD 1 - 4 RPE 6-20 Pain  1 - 10 % SpO2 HR RPD 1 - 4 RPE 6-20 Pain 0 - 10   Stretching/DB  /Monitoring               Stat Bike               Arm bike 30 min x 25 coy 35 min x 20 coy 95 79 2 12 4 95 87 2 14 4   Treadmill               Rest/PLB                    Patient's response to therapy: Good effort and Good motivation. C/o pain in arms on arm bike and quads on stepper.     Assessment    Patient's response: Patient progressing towardsd LTGs evident by: Increased PLB and Increased Pacing. Plan: Continue as written    Code     Billin units      Physician supervision provided this date of service by: Dr. Janeth White     Therapist Signature:  GIANCARLO Mora    Therapist PRINTED Name and Credential: GIANCARLO Mora    2017  2:29 PM

## 2017-06-29 ENCOUNTER — APPOINTMENT (OUTPATIENT)
Dept: PULMONOLOGY | Age: 77
End: 2017-06-29
Payer: MEDICARE

## 2017-07-06 ENCOUNTER — HOSPITAL ENCOUNTER (OUTPATIENT)
Dept: PULMONOLOGY | Age: 77
Discharge: HOME | End: 2017-07-06
Payer: MEDICARE

## 2017-07-06 PROCEDURE — G0424 PULMONARY REHAB W EXER: HCPCS

## 2017-07-06 NOTE — PROGRESS NOTES
[facility]  Outpatient Pulmonary Rehabilitation Flowsheet  [address] [facility], [address], [address] Colletta Lung  1940       Patient's carry-over of treatment delivered by: performs PLB with moderate cues. Objective Daily Findings    Respiratory Muscle Functioning/Exercise Conditioning/Strengthening Session:    Time In: 12:30  Time Out::1:30  Session Number: 19  O2 with Theapy: 0 L/min    Pre SPO2 99  Pre HR:83  Pre BP: 115/72    RR: 18 Wt: 148  Temp: not tested by patient   Post SPO2: 95 Post HR: 80  Post BP: 106/77    Self Care Home Management Instruction/Education    Self Care Home Management Instruction Education: Breathing Retaining and Exercise Concepts. Patient's Response to Education: Patient actively participated in education. Individual Therapy               Goal     Actual                      During Therapy                 Post-Therapy     % SpO2 HR RPD 1 - 4 RPE 6-20 Pain  1 - 10 % SpO2 HR RPD 1 - 4 RPE 6-20 Pain 0 - 10   Stretching/DB  /Monitoring               IS 30 min x 2000 30 min x 1800 97 75 1 8 1 96 80 1 10 1   Stepper 30 min x L5 30 min x L4 96 82 1 11 1 94 89 1 12 1   Arm Bike               Weighted DB                Arm Weights                 Patient's Response to Therapy: Good effort and Good motivation. Time In: 1:30     Session Number:20  Time Out: 2:05     O2 with therapy: 0 L/min         Individual Therapy               Goal     Actual                      During Therapy           Post Therapy     % SpO2 HR RPD 1 - 4 RPE 6-20 Pain  1 - 10 % SpO2 HR RPD 1 - 4 RPE 6-20 Pain 0 - 10   Stretching/DB  /Monitoring 5 min 5 min 97 79 1 8 1 96 74 1 8 1   Stat Bike               Arm Bike 30 min x 25 coy 30 min x 20 coy 96 77 2 13 1 96 70 2 13 1   Treadmill               Rest/PLB                    Patient's response to therapy: Increase fatique      Assessment    Patient's response: Patient progressing towardsd LTGs evident by:   Increased PLB and Increased Pacing. Plan: Continue as written    Code     Billin units      Physician supervision provided this date of service by: Dr. Julia Negron     Therapist Signature:  GIANCARLO Cabral    Therapist PRINTED Name and Credential: GIANCARLO Cabral    2017  4:52 PM

## 2017-07-11 ENCOUNTER — HOSPITAL ENCOUNTER (OUTPATIENT)
Dept: PULMONOLOGY | Age: 77
Discharge: HOME | End: 2017-07-11
Payer: MEDICARE

## 2017-07-11 PROCEDURE — G0424 PULMONARY REHAB W EXER: HCPCS

## 2017-07-11 NOTE — PROGRESS NOTES
[facility]  Outpatient Pulmonary Rehabilitation Flowsheet  [address], [address] [facility] [facility] [address]    Michelle Carias  1940       Patient's carry-over of treatment delivered by: none noted today. Objective Daily Findings      Respiratory Muscle Functioning/Exercise Conditioning/Strengthening Session:    Time In: 12:30  Time Out::1;30  Session Number: 21  O2 with Theapy: 0 L/min    Pre SPO2 95  Pre HR:88  Pre BP: 123/84    RR: 18 Wt: 148  Temp: not tested by patient   Post SPO2: 97 Post HR: 86  Post BP: 123/77    Self Care Home Management Instruction/Education    Self Care Home Management Instruction Education: Breathing Retaining and Exercise Concepts. Patient's Response to Education: Patient actively participated in education. Individual Therapy               Goal     Actual                      During Therapy                 Post-Therapy     % SpO2 HR RPD 1 - 4 RPE 6-20 Pain  1 - 10 % SpO2 HR RPD 1 - 4 RPE 6-20 Pain 0 - 10   Stretching/DB  /Monitoring 10 min 10 min 97 72 1 7 1 96 68 1 7 1   IS 30 min x 2000 20 min x 1800 97 74 1 8 1 97 73 1 8 1   IMT               Stepper 30 min x L5 30 min x L5 96 85 1 12 1 95 87 1 12 1   Weighted DB                Arm Weights                 Patient's Response to Therapy: Good effort and Good motivation.       Time In: 1:30     Session Number:22  Time Out: 2:05     O2 with therapy: 0 L/min         Individual Therapy               Goal     Actual                      During Therapy           Post Therapy     % SpO2 HR RPD 1 - 4 RPE 6-20 Pain  1 - 10 % SpO2 HR RPD 1 - 4 RPE 6-20 Pain 0 - 10   Stretching/DB  /Monitoring 5 min 5 min 97 68 1 7 1 96 73 1 7 1   Stat Bike               Arm Bike 30 min x 25 coy 30 min x 20 coy 96 85 2 10 1 97 88 2 12 1   Treadmill               Rest/PLB                    Patient's response to therapy: Good effort and Good motivation      Assessment    Patient's response: Patient progressing towardsd LTGs evident by:  Decreased Dyspnea on exertion, Decreased Fatique, Increased PLB and Increased Pacing. Plan: Continue as written    Code     Billin units      Physician supervision provided this date of service by: Dr. Lenore Scott     Therapist Signature:  GIANCARLO Lucero Che    Therapist PRINTED Name and Credential: GIANCARLO Lucero Che    2017  4:25 PM

## 2017-07-13 ENCOUNTER — HOSPITAL ENCOUNTER (OUTPATIENT)
Dept: PULMONOLOGY | Age: 77
Discharge: HOME | End: 2017-07-13
Payer: MEDICARE

## 2017-07-13 PROCEDURE — G0424 PULMONARY REHAB W EXER: HCPCS

## 2017-07-13 NOTE — PROGRESS NOTES
[facility]  Outpatient Pulmonary Rehabilitation Flowsheet  [address] [facility]  1940       Patient's carry-over of treatment delivered by: is able to work upper and lower body harder. Objective Daily Findings    Respiratory Muscle Functioning/Exercise Conditioning/Strengthening Session:    Time In: 12:30  Time Out::1:30  Session Number: 23  O2 with Theapy: 0 L/min    Pre SPO2 95  Pre HR:82  Pre BP: 130/80    RR: 18 Wt: not tested by patient  Temp: not tested by patient   Post SPO2: 97 Post HR: 88  Post BP: 122/81    Self Care Home Management Instruction/Education    Self Care Home Management Instruction Education: Breathing Retaining and Exercise Concepts. Patient's Response to Education: Patient actively participated in education. Individual Therapy               Goal     Actual                      During Therapy                 Post-Therapy     % SpO2 HR RPD 1 - 4 RPE 6-20 Pain  1 - 10 % SpO2 HR RPD 1 - 4 RPE 6-20 Pain 0 - 10   Stretching/DB  /Monitoring 10 min 10 min 97 67 1 8 1 96 69 1 8 1   IS 30 min x 2000 20 min x 1750 97 71 1 8 1 96 65 1 8 1   IMT               Arm Bike 30 min x 25 coy 30 min x 30 coy 96 83 2 15 6 95 87 2 15 8   Weighted DB                Arm Weights                 Patient's Response to Therapy: Good effort and Good motivation. C/o pain in shoulders on arm bike. Time In: 1:30     Session Number:24  Time Out: 2:05     O2 with therapy: 0 L/min         Individual Therapy               Goal     Actual                      During Therapy           Post Therapy     % SpO2 HR RPD 1 - 4 RPE 6-20 Pain  1 - 10 % SpO2 HR RPD 1 - 4 RPE 6-20 Pain 0 - 10   Stretching/DB  /Monitoring 5 min 5 min 97 68 1 7 1 97 71 1 7 1   Stat Bike               Stepper 30 min x L5 30 min x L5 96 89 1 10 5 95 87 2 12 5   Treadmill               Rest/PLB                    Patient's response to therapy: Good effort and Good motivation.  C/o pain in glutes on stepper. Assessment    Patient's response: Patient progressing towardsd LTGs evident by: Increased PLB and Increased Pacing. Plan: Continue as written    Code     Billin units      Physician supervision provided this date of service by: Dr. Kourtney Pereyra     Therapist Signature:  GIANCARLO Aguirre    Therapist PRINTED Name and Credential: GIANCARLO Aguirre    2017  3:45 PM

## 2017-07-18 ENCOUNTER — HOSPITAL ENCOUNTER (OUTPATIENT)
Dept: PULMONOLOGY | Age: 77
Discharge: HOME | End: 2017-07-18
Payer: MEDICARE

## 2017-07-18 PROCEDURE — G0424 PULMONARY REHAB W EXER: HCPCS

## 2017-07-18 NOTE — PROGRESS NOTES
[facility]  Outpatient Pulmonary Rehabilitation Flowsheet  [facility], [address] [place] [address]  [place], [address] [facility]    Bharti Garcia  1940       Patient's carry-over of treatment delivered by: none noted today. Objective Daily Findings    Comments:    Respiratory Muscle Functioning/Exercise Conditioning/Strengthening Session:    Time In: 12:30  Time Out::1:30  Session Number: 25  O2 with Theapy: 0 L/min    Pre SPO2 94  Pre HR:91  Pre BP: 112/73    RR: 18 Wt: not tested   Temp: not tested   Post SPO2: 96 Post HR: 90  Post BP: 126/75    Self Care Home Management Instruction/Education    Self Care Home Management Instruction Education: Breathing Retaining and Exercise Concepts. Patient's Response to Education: Patient actively participated in education. Comments: Individual Therapy               Goal     Actual                      During Therapy                 Post-Therapy     % SpO2 HR RPD 1 - 4 RPE 6-20 Pain  1 - 10 % SpO2 HR RPD 1 - 4 RPE 6-20 Pain 0 - 10   Stretching/DB  /Monitoring 10 min  10 min 97 66 1 8 1 96 65 1 8 1   IS 30 min x 2000 20 min x 1750 96 70 1 8 1 97 66 1 8 1   IMT               Arm Bike 30 min x 25 coy 30 min x 30 coy 95 98 2 11 4 94 89 2 12 5   Weighted DB                Arm Weights                 Patient's Response to Therapy: Good effort and Good motivation. Comments:pain in deltoids on arm bike.     Time In: 1:30     Session Number:26  Time Out: 2:05     O2 with therapy: 0 L/min         Individual Therapy               Goal     Actual                      During Therapy           Post Therapy     % SpO2 HR RPD 1 - 4 RPE 6-20 Pain  1 - 10 % SpO2 HR RPD 1 - 4 RPE 6-20 Pain 0 - 10   Stretching/DB  /Monitoring 5 min 5 min 97 65 1 7 1 95 64 1 8 1   Stat Bike               Stepper 30 min x L5 30 min x L4 95 86 1 12 2 94 92 1 12 3   Treadmill               Rest/PLB                    Patient's response to therapy: Good effort and Good motivation      Assessment    Patient's response: Patient progressing towardsd LTGs evident by: Increased PLB and Increased Pacing. Plan: Continue as written    Code     Billin units      Physician supervision provided this date of service by: Dr. Valerie Dwyer     Therapist Signature:  BRODY Pederson/L    Therapist PRINTED Name and Credential: GIANCARLO Pederson    2017  5:27 PM

## 2017-07-20 ENCOUNTER — APPOINTMENT (OUTPATIENT)
Dept: PULMONOLOGY | Age: 77
End: 2017-07-20
Payer: MEDICARE

## 2017-07-25 ENCOUNTER — HOSPITAL ENCOUNTER (OUTPATIENT)
Dept: PULMONOLOGY | Age: 77
Discharge: HOME | End: 2017-07-25
Payer: MEDICARE

## 2017-07-25 PROCEDURE — G0424 PULMONARY REHAB W EXER: HCPCS

## 2017-07-25 NOTE — PROGRESS NOTES
[facility]  Outpatient Pulmonary Rehabilitation Flowsheet  [address], [address]    Michelle Carias  1940       Patient's carry-over of treatment delivered by: arms are stronger. Objective Daily Findings      Respiratory Muscle Functioning/Exercise Conditioning/Strengthening Session:    Time In: 12:30  Time Out::1:30  Session Number: 27  O2 with Theapy: 0 L/min    Pre SPO2 94  Pre HR:88  Pre BP: 120/79    RR: 18 Wt: 145  Temp: not tested by patient   Post SPO2: 96 Post HR: 75  Post BP: 121/85    Self Care Home Management Instruction/Education    Self Care Home Management Instruction Education: Breathing Retaining and Exercise Concepts. Patient's Response to Education: Patient actively participated in education. Individual Therapy               Goal     Actual                      During Therapy                 Post-Therapy     % SpO2 HR RPD 1 - 4 RPE 6-20 Pain  1 - 10 % SpO2 HR RPD 1 - 4 RPE 6-20 Pain 0 - 10   Stretching/DB  /Monitoring 10 min 10 min 95 64 1 7 1 96 67 1 7 1   IS 25 min x 2000 20 min x 1500 96 63 1 7 1 97 64 1 8 1   IMT               Arm Bike 30 min x 25 coy 30 min x 25 coy 96 82 2 10 3 96 70 2 10 4   Weighted DB                Arm Weights                 Patient's Response to Therapy: Good effort and Good motivation. C/o pain in deltoids on arm bike. Time In: 1:30     Session Number:28  Time Out: 2:05     O2 with therapy: 0 L/min         Individual Therapy               Goal     Actual                      During Therapy           Post Therapy     % SpO2 HR RPD 1 - 4 RPE 6-20 Pain  1 - 10 % SpO2 HR RPD 1 - 4 RPE 6-20 Pain 0 - 10   Stretching/DB  /Monitoring 5 min 5 min 97 62 1 7 1 97 61 1 7 1   Stat Bike               Stepper 30 min x L5 30 min x L4 94 88 1 10 7 94 90 1 11 7   Treadmill               Rest/PLB                    Patient's response to therapy: Good effort. C/o pain in hips on stepper.       Assessment    Patient's response: Patient progressing towardsd LTGs evident by:  Decreased Fatique, Increased PLB, Increased DB and Increased Pacing. Plan: Continue as written    Code     Billin units      Physician supervision provided this date of service by: Dr. Julia Negron     Therapist Signature:  GIANCARLO Cabral    Therapist PRINTED Name and Credential: GIANCARLO Cabral    2017  5:22 PM

## 2017-07-27 ENCOUNTER — HOSPITAL ENCOUNTER (OUTPATIENT)
Dept: PULMONOLOGY | Age: 77
Discharge: HOME | End: 2017-07-27
Payer: MEDICARE

## 2017-07-27 PROCEDURE — G0424 PULMONARY REHAB W EXER: HCPCS

## 2017-07-27 NOTE — PROGRESS NOTES
[facility]  Outpatient Pulmonary Rehabilitation Flowsheet  [facility], [address] [place], [place] [address], [address] [facility]    Desmond Marrero  1940       Patient's carry-over of treatment delivered by: none noted today. Objective Daily Findings      Respiratory Muscle Functioning/Exercise Conditioning/Strengthening Session:    Time In: 12:30  Time Out::1:30  Session Number: 29  O2 with Theapy: 0 L/min    Pre SPO2 99  Pre HR:60  Pre BP: 116/73    RR: 18 Wt: 145  Temp: not tested by patient   Post SPO2: 94 Post HR: 88  Post BP: 118/81    Self Care Home Management Instruction/Education    Self Care Home Management Instruction Education: Breathing Retaining. Patient's Response to Education: Patient actively participated in education. Individual Therapy               Goal     Actual                      During Therapy                 Post-Therapy     % SpO2 HR RPD 1 - 4 RPE 6-20 Pain  1 - 10 % SpO2 HR RPD 1 - 4 RPE 6-20 Pain 0 - 10   Stretching/DB  /Monitoring 10 min 10 min 97 65 1 8 1 96 70 1 8 1   IS 25 min x 2000 20 min x 1500 95 64 1 8 1 96 67 1 8 1   IMT               Arm Bike 30 min x 25 coy 30 min x 30 coy 96 78 2 10 3 97 73 2 10 3   Weighted DB                Arm Weights                 Patient's Response to Therapy: Good effort and Good motivation. C/o pain in arms on arm bike. Time In: 1:30     Session Number:30  Time Out: 2:05     O2 with therapy: 0 L/min         Individual Therapy               Goal     Actual                      During Therapy           Post Therapy     % SpO2 HR RPD 1 - 4 RPE 6-20 Pain  1 - 10 % SpO2 HR RPD 1 - 4 RPE 6-20 Pain 0 - 10   Stretching/DB  /Monitoring 5 min 5 min 96 65 1 7 1 97 68 1 7 1   Stat Bike               Stepper 30 min x L5 30 min X L4 98 91 2 10 5 97 88 2 10 5   Treadmill               Rest/PLB                    Patient's response to therapy: Good effort and Good motivation. C/o pain in legs on stepper.       Assessment    Patient's response: Patient progressing towardsd LTGs evident by: Increased PLB and Increased Pacing. Plan: Continue as written    Code     Billin units      Physician supervision provided this date of service by: Dr. Rosalind Butt     Therapist Signature:  GIANCARLO Goldstein    Therapist PRINTED Name and Credential: GIANCARLO Goldstein    2017  3:57 PM

## 2017-08-01 ENCOUNTER — HOSPITAL ENCOUNTER (OUTPATIENT)
Dept: PULMONOLOGY | Age: 77
Discharge: HOME | End: 2017-08-01
Payer: MEDICARE

## 2017-08-01 PROCEDURE — G0424 PULMONARY REHAB W EXER: HCPCS

## 2017-08-03 ENCOUNTER — HOSPITAL ENCOUNTER (OUTPATIENT)
Dept: PULMONOLOGY | Age: 77
Discharge: HOME | End: 2017-08-03
Payer: MEDICARE

## 2017-08-03 PROCEDURE — G0424 PULMONARY REHAB W EXER: HCPCS

## 2017-08-03 NOTE — PROGRESS NOTES
[facility]  Outpatient Pulmonary Rehabilitation Flowsheet  [address] [facility]    [address]  1940       Patient's carry-over of treatment delivered by: Is stronger in her upper body. Objective Daily Findings    Comments:    Respiratory Muscle Functioning/Exercise Conditioning/Strengthening Session:    Time In: 12:30  Time Out::1:30  Session Number: 33  O2 with Theapy: 0 L/min    Pre SPO2 96  Pre HR:84  Pre BP: 126/79    RR: 18 Wt: 146  Temp: not tested by patient   Post SPO2: 96 Post HR: 80  Post BP: 123/80    Self Care Home Management Instruction/Education    Self Care Home Management Instruction Education: Breathing Retaining and Exercise Concepts, smoking cessation, collaborative self management. Patient's Response to Education: Patient actively participated in education. Individual Therapy               Goal     Actual                      During Therapy                 Post-Therapy     % SpO2 HR RPD 1 - 4 RPE 6-20 Pain  1 - 10 % SpO2 HR RPD 1 - 4 RPE 6-20 Pain 0 - 10   Stretching/DB  /Monitoring               IS 25 min x 2000 20 min x 1500 96 70 1 8 1 97 65 1 8 1   IMT               Arm Bike 30 min x 25 coy 30 min x 30 coy 96 92 2 12 4 97 94 2 12 4   Weighted DB  15 min x 4 lb 10 min x 9 lb 96 75 1 8 1 97 69 1 8 1   Arm Weights                 Patient's Response to Therapy: Good effort and Good motivation.   Comments:    Time In: 1:30     Session Number:34  Time Out: 2:05     O2 with therapy: 0 L/min         Individual Therapy               Goal     Actual                      During Therapy           Post Therapy     % SpO2 HR RPD 1 - 4 RPE 6-20 Pain  1 - 10 % SpO2 HR RPD 1 - 4 RPE 6-20 Pain 0 - 10   Stretching/DB  /Monitoring 10 min 5 min 97 71 1 7 1 97 66 1 7 1   Stat Bike               Stepper 30 min x L5 30 min x L5 96 89 2 12 4 97 93 2 15 4   Treadmill               Rest/PLB                    Patient's response to therapy: Good effort and Good motivation  Comment:s  - c/o pain in quads on stepper and shoulders on arm bike. Assessment    Patient's response: Patient progressing towardsd LTGs evident by: Increased PLB and Increased Pacing. Plan: Continue as written    Code     Billin units      Physician supervision provided this date of service by: Dr. Gissel Wilcox     Therapist Signature:  GIANCARLO Winston    Therapist PRINTED Name and Credential: GIANCARLO Winston    8/3/2017  4:18 PM

## 2017-08-08 ENCOUNTER — HOSPITAL ENCOUNTER (OUTPATIENT)
Dept: PULMONOLOGY | Age: 77
Discharge: HOME | End: 2017-08-08
Payer: MEDICARE

## 2017-08-08 PROCEDURE — G0424 PULMONARY REHAB W EXER: HCPCS

## 2017-08-08 NOTE — PROGRESS NOTES
[facility]  Outpatient Pulmonary Rehabilitation Flowsheet  [facility], [address] [place] [address] Ajay Quinteros, [address]    Lisa Lawton  1940       Patient's carry-over of treatment delivered by: walks further, cut back on smoking, stronger in arms. Objective Daily Findings    Respiratory Muscle Functioning/Exercise Conditioning/Strengthening Session:    Time In: 12:30  Time Out::1:30  Session Number: 35  O2 with Theapy: 0 L/min    Pre SPO2 95  Pre HR:92  Pre BP: 128/78    RR: 18 Wt: 150  Temp: not tested by patient   Post SPO2: 97 Post HR: 85  Post BP: 128/75    Self Care Home Management Instruction/Education    Self Care Home Management Instruction Education: Breathing Retaining and Exercise Concepts. Patient's Response to Education: Patient actively participated in education. Individual Therapy               Goal     Actual                      During Therapy                 Post-Therapy     % SpO2 HR RPD 1 - 4 RPE 6-20 Pain  1 - 10 % SpO2 HR RPD 1 - 4 RPE 6-20 Pain 0 - 10   Stretching/DB  /Monitoring               IS 25 min x 2000 20 min x 1750 97 68 1 8 1 97 66 1 8 1   Timed ambulation 10 min 10 min 98 82 1 8 1 97 96 3 12 5   Arm Bike 30 min x 25 coy 30 min x 25 coy 96 95 2 10 1 96 98 2 11 1   Weighted DB                Arm Weights                 Patient's Response to Therapy: Good motivation. Comments: c/o pain in shins ambulating.     Time In: 1:30     Session Number:36  Time Out: 2;05     02 with therapy: 0 L/min         Individual Therapy               Goal     Actual                      During Therapy           Post Therapy     % SpO2 HR RPD 1 - 4 RPE 6-20 Pain  1 - 10 % SpO2 HR RPD 1 - 4 RPE 6-20 Pain 0 - 10   Stretching/DB  /Monitoring 10 min 10 min 97 69 1 7 1 96 66 1 7 1   Stat Bike               Stepper 30 min x L5 25 min x L5 96 87 2 12 4 96 97 2 14 4   Treadmill               Rest/PLB                    Patient's response to therapy: Good motivation      Assessment    Patient's response: Patient progressing towardsd LTGs evident by: Increased PLB and Increased Pacing. Plan: discharge after treatment today. Code     Billin units      Physician supervision provided this date of service by: Dr. Srikanth Hutton     Therapist Signature:  GIANCARLO Pederson    Therapist PRINTED Name and Credential: GIANCARLO Pederson    2017  1:38 PM

## 2017-08-10 ENCOUNTER — APPOINTMENT (OUTPATIENT)
Dept: PULMONOLOGY | Age: 77
End: 2017-08-10
Payer: MEDICARE

## 2017-08-15 ENCOUNTER — APPOINTMENT (OUTPATIENT)
Dept: PULMONOLOGY | Age: 77
End: 2017-08-15
Payer: MEDICARE

## 2017-08-17 ENCOUNTER — APPOINTMENT (OUTPATIENT)
Dept: PULMONOLOGY | Age: 77
End: 2017-08-17
Payer: MEDICARE

## 2017-08-22 ENCOUNTER — APPOINTMENT (OUTPATIENT)
Dept: PULMONOLOGY | Age: 77
End: 2017-08-22
Payer: MEDICARE

## 2017-08-24 ENCOUNTER — APPOINTMENT (OUTPATIENT)
Dept: PULMONOLOGY | Age: 77
End: 2017-08-24
Payer: MEDICARE

## 2018-01-01 ENCOUNTER — TELEPHONE (OUTPATIENT)
Dept: FAMILY MEDICINE CLINIC | Age: 78
End: 2018-01-01

## 2018-01-01 ENCOUNTER — OFFICE VISIT (OUTPATIENT)
Dept: FAMILY MEDICINE CLINIC | Age: 78
End: 2018-01-01

## 2018-01-01 ENCOUNTER — LAB ONLY (OUTPATIENT)
Dept: FAMILY MEDICINE CLINIC | Age: 78
End: 2018-01-01

## 2018-01-01 ENCOUNTER — HOSPITAL ENCOUNTER (OUTPATIENT)
Dept: LAB | Age: 78
Discharge: HOME | End: 2018-11-30
Payer: MEDICARE

## 2018-01-01 VITALS
HEART RATE: 72 BPM | RESPIRATION RATE: 18 BRPM | SYSTOLIC BLOOD PRESSURE: 113 MMHG | HEIGHT: 67 IN | RESPIRATION RATE: 17 BRPM | TEMPERATURE: 96.3 F | SYSTOLIC BLOOD PRESSURE: 138 MMHG | DIASTOLIC BLOOD PRESSURE: 78 MMHG | OXYGEN SATURATION: 91 % | BODY MASS INDEX: 18.15 KG/M2 | DIASTOLIC BLOOD PRESSURE: 77 MMHG | OXYGEN SATURATION: 92 % | HEIGHT: 67 IN | HEART RATE: 84 BPM | TEMPERATURE: 96.2 F | WEIGHT: 115.6 LBS

## 2018-01-01 VITALS
RESPIRATION RATE: 16 BRPM | OXYGEN SATURATION: 94 % | HEART RATE: 100 BPM | DIASTOLIC BLOOD PRESSURE: 87 MMHG | SYSTOLIC BLOOD PRESSURE: 118 MMHG | TEMPERATURE: 96.3 F | HEIGHT: 67 IN

## 2018-01-01 VITALS
DIASTOLIC BLOOD PRESSURE: 86 MMHG | OXYGEN SATURATION: 92 % | TEMPERATURE: 95.3 F | RESPIRATION RATE: 18 BRPM | HEART RATE: 112 BPM | SYSTOLIC BLOOD PRESSURE: 127 MMHG | HEIGHT: 67 IN

## 2018-01-01 DIAGNOSIS — Z00.00 HEALTH CARE MAINTENANCE: ICD-10-CM

## 2018-01-01 DIAGNOSIS — I10 ESSENTIAL HYPERTENSION: Primary | ICD-10-CM

## 2018-01-01 DIAGNOSIS — N63.10 BREAST MASS, RIGHT: Primary | ICD-10-CM

## 2018-01-01 DIAGNOSIS — L03.119 CELLULITIS OF LOWER EXTREMITY, UNSPECIFIED LATERALITY: ICD-10-CM

## 2018-01-01 DIAGNOSIS — L08.9 FOOT INFECTION: ICD-10-CM

## 2018-01-01 DIAGNOSIS — N62 GYNECOMASTIA, MALE: Primary | ICD-10-CM

## 2018-01-01 DIAGNOSIS — Z12.5 PROSTATE CANCER SCREENING: ICD-10-CM

## 2018-01-01 DIAGNOSIS — I73.9 PERIPHERAL VASCULAR DISEASE (HCC): ICD-10-CM

## 2018-01-01 DIAGNOSIS — I25.10 CORONARY ARTERY DISEASE INVOLVING NATIVE HEART WITHOUT ANGINA PECTORIS, UNSPECIFIED VESSEL OR LESION TYPE: ICD-10-CM

## 2018-01-01 DIAGNOSIS — Z12.2 ENCOUNTER FOR SCREENING FOR LUNG CANCER: ICD-10-CM

## 2018-01-01 DIAGNOSIS — R93.2 ABNORMAL CT OF LIVER: ICD-10-CM

## 2018-01-01 DIAGNOSIS — N64.59 NIPPLE PROBLEM: Primary | ICD-10-CM

## 2018-01-01 DIAGNOSIS — I10 ESSENTIAL HYPERTENSION: ICD-10-CM

## 2018-01-01 DIAGNOSIS — Z76.89 ENCOUNTER TO ESTABLISH CARE: Primary | ICD-10-CM

## 2018-01-01 DIAGNOSIS — I25.10 CORONARY ARTERY DISEASE INVOLVING NATIVE HEART WITHOUT ANGINA PECTORIS, UNSPECIFIED VESSEL OR LESION TYPE: Primary | ICD-10-CM

## 2018-01-01 DIAGNOSIS — E27.8 ADRENAL HYPERPLASIA (HCC): ICD-10-CM

## 2018-01-01 DIAGNOSIS — N62 GYNECOMASTIA, MALE: ICD-10-CM

## 2018-01-01 DIAGNOSIS — N64.9 DISORDER OF BREAST: ICD-10-CM

## 2018-01-01 DIAGNOSIS — J44.9 CHRONIC OBSTRUCTIVE PULMONARY DISEASE, UNSPECIFIED COPD TYPE (HCC): ICD-10-CM

## 2018-01-01 DIAGNOSIS — R91.8 PULMONARY NODULES: ICD-10-CM

## 2018-01-01 DIAGNOSIS — R63.6 UNDERWEIGHT: Primary | ICD-10-CM

## 2018-01-01 DIAGNOSIS — Z86.19 HISTORY OF INFECTION: ICD-10-CM

## 2018-01-01 DIAGNOSIS — E78.5 HYPERLIPIDEMIA, UNSPECIFIED HYPERLIPIDEMIA TYPE: ICD-10-CM

## 2018-01-01 DIAGNOSIS — Z87.448 HISTORY OF RENAL INSUFFICIENCY: ICD-10-CM

## 2018-01-01 DIAGNOSIS — Z23 ENCOUNTER FOR IMMUNIZATION: ICD-10-CM

## 2018-01-01 DIAGNOSIS — Z00.00 MEDICARE ANNUAL WELLNESS VISIT, SUBSEQUENT: ICD-10-CM

## 2018-01-01 DIAGNOSIS — N64.59 NIPPLE PROBLEM: ICD-10-CM

## 2018-01-01 DIAGNOSIS — G62.9 NEUROPATHY: ICD-10-CM

## 2018-01-01 DIAGNOSIS — Z87.891 HISTORY OF TOBACCO USE: ICD-10-CM

## 2018-01-01 LAB
ALBUMIN SERPL-MCNC: 3.7 G/DL (ref 3.4–5)
ALBUMIN/GLOB SERPL: 0.9 {RATIO} (ref 0.8–1.7)
ALP SERPL-CCNC: 84 U/L (ref 45–117)
ALT SERPL-CCNC: 24 U/L (ref 16–61)
ANION GAP SERPL CALC-SCNC: 6 MMOL/L (ref 3–18)
AST SERPL-CCNC: 19 U/L (ref 15–37)
BASOPHILS # BLD: 0 K/UL (ref 0–0.1)
BASOPHILS NFR BLD: 1 % (ref 0–2)
BILIRUB SERPL-MCNC: 0.4 MG/DL (ref 0.2–1)
BUN SERPL-MCNC: 11 MG/DL (ref 7–18)
BUN/CREAT SERPL: 13 (ref 12–20)
CALCIUM SERPL-MCNC: 8.9 MG/DL (ref 8.5–10.1)
CHLORIDE SERPL-SCNC: 105 MMOL/L (ref 100–108)
CHOLEST SERPL-MCNC: 159 MG/DL
CO2 SERPL-SCNC: 29 MMOL/L (ref 21–32)
CREAT SERPL-MCNC: 0.85 MG/DL (ref 0.6–1.3)
DIFFERENTIAL METHOD BLD: ABNORMAL
EOSINOPHIL # BLD: 0.1 K/UL (ref 0–0.4)
EOSINOPHIL NFR BLD: 2 % (ref 0–5)
ERYTHROCYTE [DISTWIDTH] IN BLOOD BY AUTOMATED COUNT: 14.2 % (ref 11.6–14.5)
GLOBULIN SER CALC-MCNC: 4.1 G/DL (ref 2–4)
GLUCOSE SERPL-MCNC: 114 MG/DL (ref 74–99)
HCT VFR BLD AUTO: 38.4 % (ref 36–48)
HDLC SERPL-MCNC: 70 MG/DL (ref 40–60)
HDLC SERPL: 2.3 {RATIO} (ref 0–5)
HGB BLD-MCNC: 12 G/DL (ref 13–16)
LDLC SERPL CALC-MCNC: 64.8 MG/DL (ref 0–100)
LIPID PROFILE,FLP: ABNORMAL
LYMPHOCYTES # BLD: 1.8 K/UL (ref 0.9–3.6)
LYMPHOCYTES NFR BLD: 28 % (ref 21–52)
MCH RBC QN AUTO: 27.1 PG (ref 24–34)
MCHC RBC AUTO-ENTMCNC: 31.3 G/DL (ref 31–37)
MCV RBC AUTO: 86.9 FL (ref 74–97)
MONOCYTES # BLD: 0.3 K/UL (ref 0.05–1.2)
MONOCYTES NFR BLD: 5 % (ref 3–10)
NEUTS SEG # BLD: 4.1 K/UL (ref 1.8–8)
NEUTS SEG NFR BLD: 64 % (ref 40–73)
PLATELET # BLD AUTO: 274 K/UL (ref 135–420)
PMV BLD AUTO: 8.7 FL (ref 9.2–11.8)
POTASSIUM SERPL-SCNC: 4.3 MMOL/L (ref 3.5–5.5)
PROT SERPL-MCNC: 7.8 G/DL (ref 6.4–8.2)
PSA SERPL-MCNC: 1.7 NG/ML (ref 0–4)
RBC # BLD AUTO: 4.42 M/UL (ref 4.7–5.5)
SODIUM SERPL-SCNC: 140 MMOL/L (ref 136–145)
TRIGL SERPL-MCNC: 121 MG/DL (ref ?–150)
TSH SERPL DL<=0.05 MIU/L-ACNC: 0.82 UIU/ML (ref 0.36–3.74)
VLDLC SERPL CALC-MCNC: 24.2 MG/DL
WBC # BLD AUTO: 6.4 K/UL (ref 4.6–13.2)

## 2018-01-01 PROCEDURE — 84443 ASSAY THYROID STIM HORMONE: CPT

## 2018-01-01 PROCEDURE — 80053 COMPREHEN METABOLIC PANEL: CPT

## 2018-01-01 PROCEDURE — 84153 ASSAY OF PSA TOTAL: CPT

## 2018-01-01 PROCEDURE — 80061 LIPID PANEL: CPT

## 2018-01-01 PROCEDURE — 85025 COMPLETE CBC W/AUTO DIFF WBC: CPT

## 2018-01-01 RX ORDER — GABAPENTIN 300 MG/1
300 CAPSULE ORAL 3 TIMES DAILY
Qty: 90 CAP | Refills: 2 | Status: SHIPPED | OUTPATIENT
Start: 2018-01-01 | End: 2019-01-01 | Stop reason: SDUPTHER

## 2018-01-01 RX ORDER — THERA TABS 400 MCG
1 TAB ORAL DAILY
COMMUNITY
End: 2019-01-01

## 2018-01-01 RX ORDER — CLOPIDOGREL BISULFATE 75 MG/1
75 TABLET ORAL DAILY
Qty: 90 TAB | Refills: 3 | Status: SHIPPED | OUTPATIENT
Start: 2018-01-01 | End: 2019-11-01

## 2018-01-01 RX ORDER — ALBUTEROL SULFATE 90 UG/1
AEROSOL, METERED RESPIRATORY (INHALATION)
Refills: 6 | COMMUNITY
Start: 2018-01-01 | End: 2018-01-01

## 2018-01-01 RX ORDER — FUROSEMIDE 20 MG/1
20 TABLET ORAL DAILY
Qty: 30 TAB | Refills: 0 | Status: SHIPPED | OUTPATIENT
Start: 2018-01-01 | End: 2018-01-01 | Stop reason: SDUPTHER

## 2018-01-01 RX ORDER — ASPIRIN 81 MG/1
TABLET ORAL DAILY
COMMUNITY
End: 2018-01-01

## 2018-01-01 RX ORDER — AMOXICILLIN AND CLAVULANATE POTASSIUM 500; 125 MG/1; MG/1
TABLET, FILM COATED ORAL
Refills: 0 | COMMUNITY
Start: 2018-01-01 | End: 2018-01-01

## 2018-01-01 RX ORDER — CLOPIDOGREL BISULFATE 75 MG/1
75 TABLET ORAL DAILY
Qty: 30 TAB | Refills: 0 | Status: SHIPPED | OUTPATIENT
Start: 2018-01-01 | End: 2018-01-01 | Stop reason: SDUPTHER

## 2018-01-01 RX ORDER — CLINDAMYCIN HYDROCHLORIDE 150 MG/1
CAPSULE ORAL
COMMUNITY
Start: 2018-01-01 | End: 2019-01-01

## 2018-01-01 RX ORDER — TRAMADOL HYDROCHLORIDE 50 MG/1
TABLET ORAL
COMMUNITY
Start: 2018-01-01 | End: 2018-01-01

## 2018-01-01 RX ORDER — PRAVASTATIN SODIUM 20 MG/1
20 TABLET ORAL
Qty: 90 TAB | Refills: 3 | Status: SHIPPED | OUTPATIENT
Start: 2018-01-01 | End: 2019-01-01

## 2018-01-01 RX ORDER — PRAVASTATIN SODIUM 20 MG/1
TABLET ORAL
Refills: 5 | COMMUNITY
Start: 2018-01-01 | End: 2018-01-01

## 2018-01-01 RX ORDER — ACETAMINOPHEN 325 MG/1
TABLET ORAL
COMMUNITY
End: 2018-01-01

## 2018-01-01 RX ORDER — FUROSEMIDE 20 MG/1
TABLET ORAL
Refills: 2 | COMMUNITY
Start: 2018-01-01 | End: 2018-01-01

## 2018-01-01 RX ORDER — CARVEDILOL 6.25 MG/1
TABLET ORAL
Refills: 5 | COMMUNITY
Start: 2018-01-01 | End: 2018-01-01

## 2018-01-01 RX ORDER — FUROSEMIDE 20 MG/1
20 TABLET ORAL DAILY
Qty: 90 TAB | Refills: 3 | Status: ON HOLD | OUTPATIENT
Start: 2018-01-01 | End: 2019-01-01 | Stop reason: SDUPTHER

## 2018-01-01 RX ORDER — GABAPENTIN 100 MG/1
CAPSULE ORAL
Refills: 1 | COMMUNITY
Start: 2018-01-01 | End: 2018-01-01

## 2018-01-01 RX ORDER — CARVEDILOL 6.25 MG/1
6.25 TABLET ORAL 2 TIMES DAILY WITH MEALS
Qty: 180 TAB | Refills: 3 | Status: SHIPPED | OUTPATIENT
Start: 2018-01-01 | End: 2019-11-01

## 2018-01-01 RX ORDER — NITROGLYCERIN 0.4 MG/1
0.4 TABLET SUBLINGUAL
COMMUNITY
End: 2018-01-01

## 2018-01-01 RX ORDER — CLOPIDOGREL BISULFATE 75 MG/1
TABLET ORAL
Refills: 2 | COMMUNITY
Start: 2018-01-01 | End: 2018-01-01

## 2018-01-01 RX ORDER — VITAMIN E CAP 100 UNIT 100 UNIT
200 CAP ORAL DAILY
COMMUNITY

## 2018-01-01 RX ORDER — FUROSEMIDE 20 MG/1
TABLET ORAL
Qty: 30 TAB | Refills: 0 | Status: SHIPPED | OUTPATIENT
Start: 2018-01-01 | End: 2018-01-01

## 2018-01-01 RX ORDER — PRAVASTATIN SODIUM 20 MG/1
20 TABLET ORAL
Qty: 30 TAB | Refills: 0 | Status: SHIPPED | OUTPATIENT
Start: 2018-01-01 | End: 2018-01-01 | Stop reason: SDUPTHER

## 2018-01-01 RX ORDER — ASCORBIC ACID 500 MG
TABLET ORAL
COMMUNITY

## 2018-01-06 PROBLEM — T33.529A: Status: ACTIVE | Noted: 2018-01-06

## 2018-01-06 PROBLEM — N17.9 ACUTE RENAL FAILURE DUE TO RHABDOMYOLYSIS (HCC): Status: ACTIVE | Noted: 2018-01-06

## 2018-01-06 PROBLEM — T33.822A FROSTBITE OF FEET, BILATERAL: Status: ACTIVE | Noted: 2018-01-06

## 2018-01-06 PROBLEM — M62.82 ACUTE RENAL FAILURE DUE TO RHABDOMYOLYSIS (HCC): Status: ACTIVE | Noted: 2018-01-06

## 2018-01-06 PROBLEM — N28.9 RENAL INSUFFICIENCY: Status: ACTIVE | Noted: 2018-01-06

## 2018-01-06 PROBLEM — T33.821A FROSTBITE OF FEET, BILATERAL: Status: ACTIVE | Noted: 2018-01-06

## 2018-01-12 PROBLEM — L03.90 CELLULITIS: Status: ACTIVE | Noted: 2018-01-12

## 2018-01-13 PROBLEM — I21.4 NSTEMI (NON-ST ELEVATED MYOCARDIAL INFARCTION) (HCC): Status: ACTIVE | Noted: 2018-01-13

## 2018-05-17 ENCOUNTER — HOSPITAL ENCOUNTER (OUTPATIENT)
Dept: GENERAL RADIOLOGY | Age: 78
Discharge: HOME | End: 2018-05-17
Payer: MEDICARE

## 2018-05-17 ENCOUNTER — HOSPITAL ENCOUNTER (OUTPATIENT)
Dept: GENERAL RADIOLOGY | Age: 78
Discharge: HOME | End: 2018-05-17
Attending: NURSE PRACTITIONER
Payer: MEDICARE

## 2018-05-17 DIAGNOSIS — R06.02 SHORTNESS OF BREATH: ICD-10-CM

## 2018-05-17 PROCEDURE — 71046 X-RAY EXAM CHEST 2 VIEWS: CPT

## 2018-08-14 PROBLEM — L08.9 FOOT INFECTION: Status: ACTIVE | Noted: 2018-01-01

## 2018-08-14 PROBLEM — Z86.19 HISTORY OF INFECTION: Status: ACTIVE | Noted: 2018-01-01

## 2018-08-14 PROBLEM — N64.59 NIPPLE PROBLEM: Status: ACTIVE | Noted: 2018-01-01

## 2018-08-14 NOTE — PROGRESS NOTES
INTERNAL MEDICINE INITIAL PROVIDER VISIT    SUBJECTIVE:     Chief Complaint   Patient presents with    Establish Care    Nipple Problem     bump on right nipple       HPI: 70 y.o. male with PMHx significant for CAD, HLD, PVD is here for the above chief complaint(s). Establish Care: last PCP NP Frank Moore last seen a few months ago, has blood work to be collected including lipid panel and     CAD: S/P MI 1/2018. Dr. Gisselle Canchola following, RANULFO signed. No chest pain or shortness of breath currently. PVD: Reports \"90% blockage\" for artery in leg. Reports med mgmt treatment plan from vascular surgery. Dr. Meg Hendricks. COPD: Previously followed by pulmonary. Dr. Mikaela Jeffries. 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 S/P revision surgery of foot last week by podiatry. Dr. Cortes Oh, awaiting bone cultures. Just finished antibiotic recently. No fevers or chills. Pain managed by tramadol. Has f/u with Dr. Sherlene Boas 8/20/2018. ROS: 12 point ROS completed, positive per HPI and occasional LH, dizziness and nipple swollen and soreness. Current Outpatient Prescriptions   Medication Sig    PROAIR HFA 90 mcg/actuation inhaler INHALE 2 PUFFS PO Q 4 TO 6 HOURS PRN FOR WHEEZING    carvedilol (COREG) 6.25 mg tablet TK 1 T PO BID.  clopidogrel (PLAVIX) 75 mg tab TK 1 T PO D.    furosemide (LASIX) 20 mg tablet TK 1 T PO D.    gabapentin (NEURONTIN) 100 mg capsule INCREASE TO 2 CS PO QAM AND 2 CS QPM    pravastatin (PRAVACHOL) 20 mg tablet TK 1 T PO D.    traMADol (ULTRAM) 50 mg tablet     acetaminophen (TYLENOL) 325 mg tablet Take  by mouth every four (4) hours as needed for Pain.  aspirin delayed-release 81 mg tablet Take  by mouth daily.  nitroglycerin (NITROSTAT) 0.4 mg SL tablet 0.4 mg by SubLINGual route every five (5) minutes as needed for Chest Pain. Up to 3 doses.  therapeutic multivitamin (THERAGRAN) tablet Take 1 Tab by mouth daily.     tiotropium (SPIRIVA WITH HANDIHALER) 18 mcg inhalation capsule Take 1 Cap by inhalation daily.  ascorbic acid, vitamin C, (VITAMIN C) 500 mg tablet Take  by mouth.  vitamin e (E GEMS) 100 unit capsule Take  by mouth daily.  vit B Cmplx 3-FA-Vit C-Biotin (NEPHRO PRASANTH RX) 1- mg-mg-mcg tablet Take 1 Tab by mouth daily.  potassium 99 mg tablet Take 99 mg by mouth daily. No current facility-administered medications for this visit. Health Maintenance   Topic Date Due    DTaP/Tdap/Td series (1 - Tdap) 03/26/1963    ZOSTER VACCINE AGE 60>  01/26/2002    GLAUCOMA SCREENING Q2Y  03/26/2007    Pneumococcal 65+ Low/Medium Risk (1 of 2 - PCV13) 03/26/2007    Influenza Age 5 to Adult  08/01/2018       Medications and Allergies: Reviewed and confirmed in the chart    Past Medical Hx: Reviewed and confirmed in the chart  Past Medical History:   Diagnosis Date    Chronic obstructive pulmonary disease ([address]     Coronary artery disease     Foot infection 08/09/2018    right foot revision of bone done by podiatry Dr. Paulette Kimble [place] 01/2018    Right 2nd and 3rd finger, all toes    HLD (hyperlipidemia)     HTN (hypertension)     Peripheral vascular disease ([address]     Shellfish allergy        Family Hx, Surgical Hx, Social Hx: Reviewed and updated in EMR    OBJECTIVE:  Vitals:    08/14/18 1120   BP: 113/77   Pulse: 84   Resp: 17   Temp: 96.2 °F (35.7 °C)   TempSrc: Oral   SpO2: 92%   Height: 5' 6.5\" (1.689 m)       BP Readings from Last 3 Encounters:   08/14/18 113/77     Wt Readings from Last 3 Encounters:   No data found for Wt       General: Pleasant elderly frail man in no acute distress sitting in wheelchair. HEENT: Head is atraumatic normo-cephalic. CVS: Heart regular, rate, and rhythm. Audible S1 and S2. No murmurs, rubs or gallops. PULM: Lungs clear to auscultation bilaterally. No wheezes, rales or rhonchi. MSK: Bilateral feet with dressing in tact, no visible drainage, clean. Neuro: Alert and oriented x3.   MSE: mood euthymic, affect congruent and reactive. Nursing Notes Reviewed    ASSESSMENT AND PLAN    ICD-10-CM ICD-9-CM    1. Encounter to establish care Z76.89 V65.8 RANULFO signed for past providers   2. Nipple problem N64.59 611.79 Willing to return to discuss in 1 week   3. History of infection Z86.19 V12.00 Continue current mgmt  F/u with podiatry   4. Chronic obstructive pulmonary disease, unspecified COPD type ([facility] J44.9 496 RANULFO signed for specialist   5. Peripheral vascular disease (HCC) I73.9 443.9 RANULFO signed by specialist   6. Essential hypertension I10 401.9 Continue current mgmt  F/u with cardiology   7. Hyperlipidemia, unspecified hyperlipidemia type E78.5 272.4 Continue current mgmt   8. Coronary artery disease involving native heart without angina pectoris, unspecified vessel or lesion type I25.10 414.01 Continue current mgmt  F/u with cardiology       Orders Placed This Encounter    PROAIR HFA 90 mcg/actuation inhaler    carvedilol (COREG) 6.25 mg tablet    clopidogrel (PLAVIX) 75 mg tab    furosemide (LASIX) 20 mg tablet    gabapentin (NEURONTIN) 100 mg capsule    pravastatin (PRAVACHOL) 20 mg tablet    traMADol (ULTRAM) 50 mg tablet    acetaminophen (TYLENOL) 325 mg tablet    aspirin delayed-release 81 mg tablet    nitroglycerin (NITROSTAT) 0.4 mg SL tablet    therapeutic multivitamin (THERAGRAN) tablet    tiotropium (SPIRIVA WITH HANDIHALER) 18 mcg inhalation capsule    ascorbic acid, vitamin C, (VITAMIN C) 500 mg tablet    vitamin e (E GEMS) 100 unit capsule    vit B Cmplx 3-FA-Vit C-Biotin (NEPHRO PRASANTH RX) 1- mg-mg-mcg tablet    potassium 99 mg tablet       Future Appointments  Date Time Provider Jeevan Abreu   8/21/2018 10:00 AM Lamar Hernandez MD [address] [place] [address] printed and provided to patient    Assessment and plan above discussed with patient, patient voiced understanding and agreement with plan.     More than 50% of this 45 min visit was spent face to face counseling the patient about the etiology and treatment options for the above health conditions outlined in assessment and plan       Rosalina Lovell M.D.   [address], [address] [place] [address], [address] [place] [address]   [facility] [phone]

## 2018-08-14 NOTE — PROGRESS NOTES
Daisy Hodges is a 70 y.o. male presents in office to establish care and bump on right nipple. There are no preventive care reminders to display for this patient. 1. Have you been to the ER, urgent care clinic since your last visit? Hospitalized since your last visit? No    2. Have you seen or consulted any other health care providers outside of the [facility] since your last visit? Include any pap smears or colon screening. Dr. Galdino Plaza, Podiatry;  Norman Gardner NP [facility]; Dr. Eric Lujan Vascular Surgery; Dr. Mariah Zamarripa

## 2018-08-14 NOTE — PATIENT INSTRUCTIONS
Return to give blood after fasting 8 hours. No food or coffee. Water and medicines are okay. Lab opens from 8:30AM to 4:30PM Monday through Friday. No need to schedule an appointment.

## 2018-08-14 NOTE — MR AVS SNAPSHOT
[address] [place] [address] 
[phone] Patient: Salma Vargas MRN: [id number] VQJ:[birth year] Visit Information Date & Time Provider Department Dept. Phone Encounter #  
 8/14/2018 11:00 AM Jennifer Leavitt MD 116[address] [facility] [phone] [id number] Follow-up Instructions Return in about 1 week (around 8/21/2018), or if symptoms worsen or fail to improve, for nipple soreness. Allergies as of 8/14/2018  Review Complete On: 8/14/2018 By: Jennifer Leavitt MD  
  
 Severity Noted Reaction Type Reactions Shellfish Derived High 08/14/2018    Anaphylaxis, Swelling Current Immunizations  Never Reviewed No immunizations on file. Not reviewed this visit You Were Diagnosed With   
  
 Codes Comments Nipple problem    -  Primary ICD-10-CM: N64.59 
ICD-9-CM: 611.79 History of infection     ICD-10-CM: Z86.19 ICD-9-CM: V12.00 Chronic obstructive pulmonary disease, unspecified COPD type ([facility]     ICD-10-CM: J44.9 ICD-9-CM: 550 Peripheral vascular disease ([facility]     ICD-10-CM: I73.9 ICD-9-CM: 443.9 Essential hypertension     ICD-10-CM: I10 
ICD-9-CM: 401.9 Hyperlipidemia, unspecified hyperlipidemia type     ICD-10-CM: E78.5 ICD-9-CM: 272.4 Coronary artery disease involving native heart without angina pectoris, unspecified vessel or lesion type     ICD-10-CM: I25.10 ICD-9-CM: 414.01 Vitals BP Pulse Temp Resp Height(growth percentile) SpO2  
 113/77 84 96.2 °F (35.7 °C) (Oral) 17 5' 6.5\" (1.689 m) 92% Smoking Status Former Smoker Your Updated Medication List  
  
   
This list is accurate as of 8/14/18 12:07 PM.  Always use your most recent med list.  
  
  
  
  
 acetaminophen 325 mg tablet Commonly known as:  TYLENOL Take  by mouth every four (4) hours as needed for Pain. aspirin delayed-release 81 mg tablet Take  by mouth daily. carvedilol 6.25 mg tablet Commonly known as:  COREG  
TK 1 T PO BID. clopidogrel 75 mg Tab Commonly known as:  PLAVIX TK 1 T PO D.  
  
 furosemide 20 mg tablet Commonly known as:  LASIX TK 1 T PO D.  
  
 gabapentin 100 mg capsule Commonly known as:  NEURONTIN  
INCREASE TO 2 CS PO QAM AND 2 CS QPM  
  
 nitroglycerin 0.4 mg SL tablet Commonly known as:  NITROSTAT  
0.4 mg by SubLINGual route every five (5) minutes as needed for Chest Pain. Up to 3 doses. potassium 99 mg tablet Take 99 mg by mouth daily. pravastatin 20 mg tablet Commonly known as:  PRAVACHOL TK 1 T PO D.  
  
 PROAIR HFA 90 mcg/actuation inhaler Generic drug:  albuterol INHALE 2 PUFFS PO Q 4 TO 6 HOURS PRN FOR WHEEZING  
  
 SPIRIVA WITH HANDIHALER 18 mcg inhalation capsule Generic drug:  tiotropium Take 1 Cap by inhalation daily. therapeutic multivitamin tablet Commonly known as:  [facility] Take 1 Tab by mouth daily. traMADol 50 mg tablet Commonly known as:  ULTRAM  
  
 vit B Cmplx 3-FA-Vit C-Biotin 1- mg-mg-mcg tablet Commonly known as:  NEPHRO PRASANTH RX Take 1 Tab by mouth daily. VITAMIN C 500 mg tablet Generic drug:  ascorbic acid (vitamin C) Take  by mouth.  
  
 vitamin e 100 unit capsule Commonly known as:  E GEMS Take  by mouth daily. Follow-up Instructions Return in about 1 week (around 8/21/2018), or if symptoms worsen or fail to improve, for nipple soreness. Patient Instructions Return to give blood after fasting 8 hours. No food or coffee. Water and medicines are okay. Lab opens from 8:30AM to 4:30PM Monday through Friday. No need to schedule an appointment. Introducing [facility]! [place] [place] introduces Impact patient portal. Now you can access parts of your medical record, email your doctor's office, and request medication refills online.    
 
1. In your internet browser, go to [email or web address] 2. Click on the First Time User? Click Here link in the Sign In box. You will see the New Member Sign Up page. 3. Enter your iJukebox Access Code exactly as it appears below. You will not need to use this code after youve completed the sign-up process. If you do not sign up before the expiration date, you must request a new code. · iJukebox Access Code: [email or web address] Expires: 11/12/2018 12:04 PM 
 
4. Enter the last four digits of your Social Security Number (xxxx) and Date of Birth (mm/dd/yyyy) as indicated and click Submit. You will be taken to the next sign-up page. 5. Create a PadProoft ID. This will be your iJukebox login ID and cannot be changed, so think of one that is secure and easy to remember. 6. Create a iJukebox password. You can change your password at any time. 7. Enter your Password Reset Question and Answer. This can be used at a later time if you forget your password. 8. Enter your e-mail address. You will receive e-mail notification when new information is available in [address] 9. Click Sign Up. You can now view and download portions of your medical record. 10. Click the Download Summary menu link to download a portable copy of your medical information. If you have questions, please visit the Frequently Asked Questions section of the iJukebox website. Remember, iJukebox is NOT to be used for urgent needs. For medical emergencies, dial 911. Now available from your iPhone and Android! Please provide this summary of care documentation to your next provider. If you have any questions after today's visit, please call [phone].

## 2018-08-20 NOTE — TELEPHONE ENCOUNTER
Wheelchair order signed and placed in outgoing fax. Lamar Hernandez M.D.   [address], [address] [place] [address], [address] [place] [address] - [phone]  [place] [phone]

## 2018-08-21 PROBLEM — D64.9 ANEMIA: Status: ACTIVE | Noted: 2018-01-01

## 2018-08-21 NOTE — MR AVS SNAPSHOT
[address] [address] 
[phone] Patient: Bong Orozco MRN: [id number] OYN:[birth year] Visit Information Date & Time Provider Department Dept. Phone Encounter #  
 8/21/2018 10:00 AM Sayra Herrera MD [address] [facility] [address] [phone] [id number] Follow-up Instructions Return in about 6 weeks (around 10/2/2018), or if symptoms worsen or fail to improve, for breast mass. Upcoming Health Maintenance Date Due DTaP/Tdap/Td series (1 - Tdap) 3/26/1963 ZOSTER VACCINE AGE 60> 1/26/2002 GLAUCOMA SCREENING Q2Y 3/26/2007 Pneumococcal 65+ Low/Medium Risk (1 of 2 - PCV13) 3/26/2007 Influenza Age 5 to Adult 8/1/2018 MEDICARE YEARLY EXAM 8/14/2018 Allergies as of 8/21/2018  Review Complete On: 8/21/2018 By: Dona Schaffer LPN Severity Noted Reaction Type Reactions Shellfish Derived High 08/14/2018    Anaphylaxis, Swelling Current Immunizations  Never Reviewed No immunizations on file. Not reviewed this visit You Were Diagnosed With   
  
 Codes Comments Nipple problem    -  Primary ICD-10-CM: N64.59 
ICD-9-CM: 611.79 Vitals BP Pulse Temp Resp Height(growth percentile) SpO2  
 127/86 (!) 112 95.3 °F (35.2 °C) (Oral) 18 5' 6.5\" (1.689 m) 92% Smoking Status Former Smoker Your Updated Medication List  
  
   
This list is accurate as of 8/21/18 11:19 AM.  Always use your most recent med list.  
  
  
  
  
 acetaminophen 325 mg tablet Commonly known as:  TYLENOL Take  by mouth every four (4) hours as needed for Pain. aspirin delayed-release 81 mg tablet Take  by mouth daily. carvedilol 6.25 mg tablet Commonly known as:  COREG  
TK 1 T PO BID. clopidogrel 75 mg Tab Commonly known as:  PLAVIX TK 1 T PO D.  
  
 furosemide 20 mg tablet Commonly known as:  LASIX TK 1 T PO D.  
  
 gabapentin 100 mg capsule Commonly known as:  NEURONTIN  
INCREASE TO 2 CS PO QAM AND 2 CS QPM  
  
 nitroglycerin 0.4 mg SL tablet Commonly known as:  NITROSTAT  
0.4 mg by SubLINGual route every five (5) minutes as needed for Chest Pain. Up to 3 doses. potassium 99 mg tablet Take 99 mg by mouth daily. pravastatin 20 mg tablet Commonly known as:  PRAVACHOL TK 1 T PO D.  
  
 PROAIR HFA 90 mcg/actuation inhaler Generic drug:  albuterol INHALE 2 PUFFS PO Q 4 TO 6 HOURS PRN FOR WHEEZING  
  
 SPIRIVA WITH HANDIHALER 18 mcg inhalation capsule Generic drug:  tiotropium Take 1 Cap by inhalation daily. therapeutic multivitamin tablet Commonly known as:  [facility] Take 1 Tab by mouth daily. traMADol 50 mg tablet Commonly known as:  ULTRAM  
  
 vit B Cmplx 3-FA-Vit C-Biotin 1- mg-mg-mcg tablet Commonly known as:  NEPHRO PRASANTH RX Take 1 Tab by mouth daily. VITAMIN C 500 mg tablet Generic drug:  ascorbic acid (vitamin C) Take  by mouth.  
  
 vitamin e 100 unit capsule Commonly known as:  E GEMS Take  by mouth daily. Follow-up Instructions Return in about 6 weeks (around 10/2/2018), or if symptoms worsen or fail to improve, for breast mass. To-Do List   
 08/21/2018 Imaging:  US BREAST RT LIMITED=<3 QUAD Patient Instructions You should be called for ultrasound of right breast within 7 days, if no word please call clinic to discuss. Introducing [facility]! [facility] introduces WSO2 patient portal. Now you can access parts of your medical record, email your doctor's office, and request medication refills online. 1. In your internet browser, go to [email or web address] 2. Click on the First Time User? Click Here link in the Sign In box. You will see the New Member Sign Up page. 3. Enter your WSO2 Access Code exactly as it appears below.  You will not need to use this code after youve completed the sign-up process. If you do not sign up before the expiration date, you must request a new code. · Conversation Media Access Code: [email or web address] Expires: 11/12/2018 12:04 PM 
 
4. Enter the last four digits of your Social Security Number (xxxx) and Date of Birth (mm/dd/yyyy) as indicated and click Submit. You will be taken to the next sign-up page. 5. Create a Conversation Media ID. This will be your Conversation Media login ID and cannot be changed, so think of one that is secure and easy to remember. 6. Create a Conversation Media password. You can change your password at any time. 7. Enter your Password Reset Question and Answer. This can be used at a later time if you forget your password. 8. Enter your e-mail address. You will receive e-mail notification when new information is available in [address] 9. Click Sign Up. You can now view and download portions of your medical record. 10. Click the Download Summary menu link to download a portable copy of your medical information. If you have questions, please visit the Frequently Asked Questions section of the Conversation Media website. Remember, Conversation Media is NOT to be used for urgent needs. For medical emergencies, dial 911. Now available from your iPhone and Android! Please provide this summary of care documentation to your next provider. If you have any questions after today's visit, please call [phone].

## 2018-08-21 NOTE — PROGRESS NOTES
Order for [facility] was faxed to [facility]. Fax confirmation shows it was received by [facility].

## 2018-08-21 NOTE — PATIENT INSTRUCTIONS
You should be called for ultrasound of right breast within 7 days, if no word please call clinic to discuss.

## 2018-08-21 NOTE — PROGRESS NOTES
Albadylon Cate presents today for   Chief Complaint   Patient presents with    Nipple Problem     right nipple soreness     Patient denies pain at this time but pain is upon touch or pressure. Titus Esposito preferred language for health care discussion is english/other. Is someone accompanying this pt? Yes, spouse    Is the patient using any DME equipment during [address]? Yes, wheelchair    Depression Screening:  PHQ over the last two weeks 8/14/2018   Little interest or pleasure in doing things Not at all   Feeling down, depressed, irritable, or hopeless Not at all   Total Score PHQ 2 0       Learning Assessment:  Learning Assessment 8/14/2018   PRIMARY LEARNER Patient   [address] CAREGIVER Yes   CO-LEARNER NAME Hallie Salgado (spouse)    PRIMARY LANGUAGE ENGLISH   LEARNER PREFERENCE PRIMARY OTHER (COMMENT)   ANSWERED BY patient   RELATIONSHIP SELF       Abuse Screening:  Abuse Screening Questionnaire 8/14/2018   Do you ever feel afraid of your partner? N   Are you in a relationship with someone who physically or mentally threatens you? N   Is it safe for you to go home? Y       Fall Risk  Fall Risk Assessment, last 12 mths 8/14/2018   Able to walk? No       Health Maintenance reviewed and discussed and ordered per Provider. Health Maintenance Due   Topic Date Due    DTaP/Tdap/Td series (1 - Tdap) 03/26/1963    ZOSTER VACCINE AGE 60>  01/26/2002    GLAUCOMA SCREENING Q2Y  03/26/2007    Pneumococcal 65+ Low/Medium Risk (1 of 2 - PCV13) 03/26/2007    Influenza Age 9 to Adult  08/01/2018    MEDICARE YEARLY EXAM  08/14/2018   . Titus Esposito is updated on all     Pt currently taking Antiplatelet therapy? Yes, ASA 81 mg    Coordination of Care:  1. Have you been to the ER, urgent care clinic since your last visit? NO  Hospitalized since your last visit? NO    2. Have you seen or consulted any other health care providers outside of the [address] [place] [address] since your last visit?  NO Include any pap smears or colon screening.  NO

## 2018-08-21 NOTE — PROGRESS NOTES
Internal Medicine Follow Up Visit Note    Chief Complaint   Patient presents with    Nipple Problem     right nipple soreness       HPI:  Mart Alford is a 70 y.o.  male is here for the above complaint(s). Right Nipple Soreness, noted 2 weeks ago. Some swelling, some improvement. No redness, no warmth, no drainage. No soreness unless touched. Less sore compared to before. No similar symptoms. No known family history of breast cancer. Current Outpatient Prescriptions   Medication Sig    PROAIR HFA 90 mcg/actuation inhaler INHALE 2 PUFFS PO Q 4 TO 6 HOURS PRN FOR WHEEZING    carvedilol (COREG) 6.25 mg tablet TK 1 T PO BID.  clopidogrel (PLAVIX) 75 mg tab TK 1 T PO D.    furosemide (LASIX) 20 mg tablet TK 1 T PO D.    gabapentin (NEURONTIN) 100 mg capsule INCREASE TO 2 CS PO QAM AND 2 CS QPM    pravastatin (PRAVACHOL) 20 mg tablet TK 1 T PO D.    traMADol (ULTRAM) 50 mg tablet     acetaminophen (TYLENOL) 325 mg tablet Take  by mouth every four (4) hours as needed for Pain.  aspirin delayed-release 81 mg tablet Take  by mouth daily.  nitroglycerin (NITROSTAT) 0.4 mg SL tablet 0.4 mg by SubLINGual route every five (5) minutes as needed for Chest Pain. Up to 3 doses.  therapeutic multivitamin (THERAGRAN) tablet Take 1 Tab by mouth daily.  tiotropium (SPIRIVA WITH HANDIHALER) 18 mcg inhalation capsule Take 1 Cap by inhalation daily.  ascorbic acid, vitamin C, (VITAMIN C) 500 mg tablet Take  by mouth.  vitamin e (E GEMS) 100 unit capsule Take  by mouth daily.  vit B Cmplx 3-FA-Vit C-Biotin (NEPHRO PRASANTH RX) 1- mg-mg-mcg tablet Take 1 Tab by mouth daily.  potassium 99 mg tablet Take 99 mg by mouth daily. No current facility-administered medications for this visit.       Health Maintenance   Topic Date Due    DTaP/Tdap/Td series (1 - Tdap) 03/26/1963    ZOSTER VACCINE AGE 60>  01/26/2002    GLAUCOMA SCREENING Q2Y  03/26/2007    Pneumococcal 65+ Low/Medium Risk (1 of 2 - PCV13) 03/26/2007    Influenza Age 9 to Adult  08/01/2018    MEDICARE YEARLY EXAM  08/14/2018       There is no immunization history on file for this patient. Allergies and Medications: Reviewed and updated in EMR. Past Medical History:   Diagnosis Date    Chronic obstructive pulmonary disease ([facility]     Coronary artery disease     Foot infection 08/09/2018    right foot revision of bone done by podiatry Dr. Brooks Burt Frostbite 01/2018    Right 2nd and 3rd finger, all toes    HLD (hyperlipidemia)     HTN (hypertension)     Peripheral vascular disease ([facility]     Shellfish allergy        Family History, Social History, Past Medical History, Surgical History: Reviewed and updated in EMR as appropriate. OBJECTIVE:   Visit Vitals    /86    Pulse (!) 112    Temp 95.3 °F (35.2 °C) (Oral)  Comment: pt drank cold water    Resp 18    Ht 5' 6.5\" (1.689 m)    SpO2 92%        BP Readings from Last 3 Encounters:   08/21/18 127/86   08/14/18 113/77     Wt Readings from Last 3 Encounters:   No data found for Wt       General: Pleasant elderly man in no acute distress  Skin: Right nipple 1.5 mm x 1.5 mm soft mobile slightly tender mass, no redness/warmth or expressible discharge. Nursing Notes Reviewed. ASSESSMENT/PLAN:      ICD-10-CM ICD-9-CM    1. Nipple problem N64.59 611.79 US BREAST RT LIMITED=<3 QUAD     Patient verbalized understanding and agreement with the plan. Patient was given an after-visit summary. Follow-up Disposition:  Return in about 6 weeks (around 10/2/2018), or if symptoms worsen or fail to improve, for breast mass. or sooner if worsening symptoms. More than 50% of this 15  min visit was spent counseling the patient face to face about etiology and treatment of health conditions outlined in assessment and plan. Talib Alcaraz M.D.   [address], [address] [place] [address], [address] [place] [address] - [phone]  [facility] - Saul Donnie [address]

## 2018-08-28 NOTE — TELEPHONE ENCOUNTER
Pt needs wheelchair given recent bilateral toe amputations. Order for wheelchair placed in outgoing fax. Beth Johnson M.D.   [address], [address] [place] [address] [phone]  [facility] [phone]

## 2018-08-29 PROBLEM — N63.10 BREAST MASS, RIGHT: Status: ACTIVE | Noted: 2018-01-01

## 2018-08-29 NOTE — TELEPHONE ENCOUNTER
Vinayak Ends with [address] [facility] [address] states pt is currently there for an US of the right breast. Vinayak Ends requesting an order for Bilateral Diagnostic mammogram of the right breast. States please fax to [phone] ASAP.

## 2018-08-29 NOTE — TELEPHONE ENCOUNTER
Order faxed. Talib Alcaraz M.D.   [facility]  [facility], [address] [place] [address], [address] [place] [address] -   [facility] - [phone]

## 2018-09-05 PROBLEM — N63.10 BREAST MASS, RIGHT: Status: RESOLVED | Noted: 2018-01-01 | Resolved: 2018-01-01

## 2018-09-05 PROBLEM — N62 GYNECOMASTIA, MALE: Status: ACTIVE | Noted: 2018-01-01

## 2018-09-10 NOTE — TELEPHONE ENCOUNTER
Called to discuss breast imaging results. Gynecomastia with no abnormal lesions. Will discuss further at 10/2/2018. Melissa Rankin M.D.   [employer]  [address] [place] [address], [address] [place], [place] [address] [phone]  [facility] [phone]

## 2018-09-25 PROBLEM — K63.5 COLON POLYP: Status: ACTIVE | Noted: 2018-01-01

## 2018-09-25 NOTE — TELEPHONE ENCOUNTER
THYROID CASCADE (TSH, T3, T4) (01/24/2018 12:18 PM)  THYROID CASCADE (TSH, T3, T4) (01/24/2018 12:18 PM)   Component Value Ref Range   TSH 1.37 0.27 - 4.20 mcU/mL     THYROID CASCADE (TSH, T3, T4) (01/24/2018 12:18 PM)   Specimen Performing Laboratory   Blood [facility]    [address]       CBC/Diff:   Recent Labs   02/06/18  0454   WBC 7.0   HEMOGLOBIN 10.9*   HCT 34.5*   MCV 94   MCH 30   MCHC 32   RDW 14.1   PLATELET 262   MPV 9.5

## 2018-10-02 PROBLEM — T33.529A: Status: RESOLVED | Noted: 2018-01-06 | Resolved: 2018-01-01

## 2018-10-02 PROBLEM — Z87.448 HISTORY OF RENAL INSUFFICIENCY: Status: ACTIVE | Noted: 2018-01-06

## 2018-10-02 PROBLEM — Z86.19 HISTORY OF INFECTION: Status: RESOLVED | Noted: 2018-01-01 | Resolved: 2018-01-01

## 2018-10-02 PROBLEM — N64.59 NIPPLE PROBLEM: Status: RESOLVED | Noted: 2018-01-01 | Resolved: 2018-01-01

## 2018-10-02 PROBLEM — N17.9 ACUTE RENAL FAILURE DUE TO RHABDOMYOLYSIS (HCC): Status: RESOLVED | Noted: 2018-01-06 | Resolved: 2018-01-01

## 2018-10-02 PROBLEM — Z91.89 HISTORY OF FROSTBITE: Status: ACTIVE | Noted: 2018-01-06

## 2018-10-02 PROBLEM — M62.82 ACUTE RENAL FAILURE DUE TO RHABDOMYOLYSIS (HCC): Status: RESOLVED | Noted: 2018-01-06 | Resolved: 2018-01-01

## 2018-10-02 PROBLEM — R91.8 PULMONARY NODULES: Status: ACTIVE | Noted: 2018-01-01

## 2018-10-02 PROBLEM — Z00.00 HEALTH CARE MAINTENANCE: Status: ACTIVE | Noted: 2018-01-01

## 2018-10-02 NOTE — MR AVS SNAPSHOT
[address] [address] [place] [address] 
[phone] Patient: Radha Weaver MRN: [id number] SFF:4/44/9612 Visit Information Date & Time Provider Department Dept. Phone Encounter #  
 10/2/2018 10:30 AM Calin Veras MD [address] [facility] [phone] [id number] Follow-up Instructions Return in about 10 weeks (around 12/11/2018) for GYNECOMASTIA. Upcoming Health Maintenance Date Due DTaP/Tdap/Td series (1 - Tdap) 3/26/1963 Shingrix Vaccine Age 50> (1 of 2) 3/26/1992 GLAUCOMA SCREENING Q2Y 3/26/2007 Influenza Age 5 to Adult 8/1/2018 MEDICARE YEARLY EXAM 8/14/2018 Prostate-Specific Antigen 8/17/2019 Allergies as of 10/2/2018  Review Complete On: 10/2/2018 By: Renny Dukes LPN Severity Noted Reaction Type Reactions Betadine [Povidone-iodine] High 01/13/2018    Anaphylaxis, Not Reported This Time Pt denies betadine allergy Crab High 01/24/2018    Anaphylaxis Iodine High 04/04/2018    Anaphylaxis Shellfish Containing Products High 01/24/2018    Anaphylaxis Shellfish Derived High 08/14/2018    Anaphylaxis, Swelling Shrimp High 01/24/2018    Anaphylaxis Seafood  03/13/2018    Other (comments) Current Immunizations  Never Reviewed Name Date Influenza High Dose Vaccine PF 11/9/2017 Influenza Vaccine (Tri) Adjuvanted  Incomplete Pneumococcal Conjugate (PCV-13) 9/26/2017 Pneumococcal Polysaccharide (PPSV-23) 11/10/2010 Not reviewed this visit You Were Diagnosed With   
  
 Codes Comments Gynecomastia, male    -  Primary ICD-10-CM: N62 
ICD-9-CM: 611.1 Encounter for immunization     ICD-10-CM: A24 ICD-9-CM: V03.89 Cellulitis of lower extremity, unspecified laterality     ICD-10-CM: L03.119 ICD-9-CM: 682.6  Coronary artery disease involving native heart without angina pectoris, unspecified vessel or lesion type     ICD-10-CM: I25.10 ICD-9-CM: 414.01 Chronic obstructive pulmonary disease, unspecified COPD type ([facility]     ICD-10-CM: J44.9 ICD-9-CM: 497 Peripheral vascular disease ([facility]     ICD-10-CM: I73.9 ICD-9-CM: 443.9 Renal insufficiency     ICD-10-CM: N28.9 ICD-9-CM: 593.9 Vitals BP Pulse Temp Resp Height(growth percentile) SpO2  
 118/87 100 96.3 °F (35.7 °C) (Oral) 16 5' 6.5\" (1.689 m) 94% Smoking Status Former Smoker Preferred Pharmacy Pharmacy Name Phone [phone] [phone] [facility] 18 [phone] Your Updated Medication List  
  
   
This list is accurate as of 10/2/18 11:16 AM.  Always use your most recent med list.  
  
  
  
  
 * acetaminophen 325 mg tablet Commonly known as:  TYLENOL Take 325 mg by mouth every four (4) hours as needed for Pain. * acetaminophen 325 mg tablet Commonly known as:  TYLENOL Take  by mouth every four (4) hours as needed for Pain.  
  
 amoxicillin-clavulanate 500-125 mg per tablet Commonly known as:  AUGMENTIN  
TK 1 T PO BID * aspirin 81 mg chewable tablet Take 81 mg by mouth daily. * aspirin delayed-release 81 mg tablet Take  by mouth daily. carvedilol 6.25 mg tablet Commonly known as:  Lender Fre[place]erg Take 6.25 mg by mouth two (2) times daily (with meals). COLACE 100 mg capsule Generic drug:  docusate sodium Take 100 mg by mouth daily. gabapentin 100 mg capsule Commonly known as:  NEURONTIN Take 100 mg by mouth two (2) times a day. LASIX 20 mg tablet Generic drug:  furosemide Take 20 mg by mouth daily. multivitamin tablet Commonly known as:  ONE A DAY Take 1 Tab by mouth daily. nitroglycerin 0.4 mg SL tablet Commonly known as:  NITROSTAT  
0.4 mg by SubLINGual route every five (5) minutes as needed for Chest Pain. PLAVIX 75 mg Tab Generic drug:  clopidogrel Take 75 mg by mouth.  
  
 potassium 99 mg tablet Take 99 mg by mouth daily. pravastatin 20 mg tablet Commonly known as:  PRAVACHOL Take 20 mg by mouth nightly. * PROAIR HFA 90 mcg/actuation inhaler Generic drug:  albuterol Take  by inhalation. * PROAIR HFA 90 mcg/actuation inhaler Generic drug:  albuterol INHALE 2 PUFFS PO Q 4 TO 6 HOURS PRN FOR WHEEZING  
  
 silver sulfADIAZINE 1 % topical cream  
Commonly known as:  SILVADENE Apply  to affected area daily. * SPIRIVA WITH HANDIHALER 18 mcg inhalation capsule Generic drug:  tiotropium Take 1 Cap by inhalation daily. * SPIRIVA WITH HANDIHALER 18 mcg inhalation capsule Generic drug:  tiotropium Take 1 Cap by inhalation daily. therapeutic multivitamin tablet Commonly known as:  [facility] Take 1 Tab by mouth daily. traMADol 50 mg tablet Commonly known as:  ULTRAM  
Take 50 mg by mouth every six (6) hours as needed for Pain.  
  
 vit B Cmplx 3-FA-Vit C-Biotin 1- mg-mg-mcg tablet Commonly known as:  NEPHRO PRASANTH RX Take 1 Tab by mouth daily. VITAMIN C 500 mg tablet Generic drug:  ascorbic acid (vitamin C) Take  by mouth.  
  
 vitamin e 100 unit capsule Commonly known as:  E GEMS Take  by mouth daily. * Notice: This list has 8 medication(s) that are the same as other medications prescribed for you. Read the directions carefully, and ask your doctor or other care provider to review them with you. We Performed the Following INFLUENZA VACCINE INACTIVATED (IIV), SUBUNIT, ADJUVANTED, IM [id number] CPT(R)] Follow-up Instructions Return in about 10 weeks (around 12/11/2018) for GYNECOMASTIA. Patient Instructions Return Friday for blood work, lab opens 8:30 to 3 pm 
 
Bring back ACP form next visit Introducing [facility] SERVICES! [employer] introduces TrepUp patient portal. Now you can access parts of your medical record, email your doctor's office, and request medication refills online. 1. In your internet browser, go to [email or web address] 2. Click on the First Time User? Click Here link in the Sign In box. You will see the New Member Sign Up page. 3. Enter your TrepUp Access Code exactly as it appears below. You will not need to use this code after youve completed the sign-up process. If you do not sign up before the expiration date, you must request a new code. · TrepUp Access Code: [email or web address] Expires: 11/12/2018 12:04 PM 
 
4. Enter the last four digits of your Social Security Number (xxxx) and Date of Birth (mm/dd/yyyy) as indicated and click Submit. You will be taken to the next sign-up page. 5. Create a TrepUp ID. This will be your TrepUp login ID and cannot be changed, so think of one that is secure and easy to remember. 6. Create a TrepUp password. You can change your password at any time. 7. Enter your Password Reset Question and Answer. This can be used at a later time if you forget your password. 8. Enter your e-mail address. You will receive e-mail notification when new information is available in [address] 9. Click Sign Up. You can now view and download portions of your medical record. 10. Click the Download Summary menu link to download a portable copy of your medical information. If you have questions, please visit the Frequently Asked Questions section of the TrepUp website. Remember, TrepUp is NOT to be used for urgent needs. For medical emergencies, dial 911. Now available from your iPhone and Android! Please provide this summary of care documentation to your next provider. Your primary care clinician is listed as Nathaniel Farias. If you have any questions after today's visit, please call [phone].

## 2018-10-02 NOTE — PROGRESS NOTES
Internal Medicine Follow Up Visit Note Chief Complaint Patient presents with  Follow-up  
  breast mass and test results  Medication Refill Plavix, lasix, pravachol HPI:  Evon Piper is a 70 y.o.  male with history significant for PVD, CAD, COPD, h/o frostbite s/p amputations followed by podiatry is here for the above complaint(s). Breast mass: ultrasound and mammogram normal breast tissue. No breast discharge. Intermittent soreness, improving. No medications associated with gynecomastia. No h/o cirrhosis, ESRD ON HD, hyperthryoidism, drugs/alcohol intake, testicular/adrenal tumors. Foot infection, followed by podiatrist. Taking augmentin and another antibiotic unknown. Will call us with name. Current Outpatient Prescriptions Medication Sig  
 amoxicillin-clavulanate (AUGMENTIN) 500-125 mg per tablet TK 1 T PO BID  PROAIR HFA 90 mcg/actuation inhaler INHALE 2 PUFFS PO Q 4 TO 6 HOURS PRN FOR WHEEZING  therapeutic multivitamin (THERAGRAN) tablet Take 1 Tab by mouth daily.  tiotropium (SPIRIVA WITH HANDIHALER) 18 mcg inhalation capsule Take 1 Cap by inhalation daily.  ascorbic acid, vitamin C, (VITAMIN C) 500 mg tablet Take  by mouth.  vitamin e (E GEMS) 100 unit capsule Take  by mouth daily.  potassium 99 mg tablet Take 99 mg by mouth daily.  multivitamin (ONE A DAY) tablet Take 1 Tab by mouth daily.  aspirin 81 mg chewable tablet Take 81 mg by mouth daily.  docusate sodium (COLACE) 100 mg capsule Take 100 mg by mouth daily.  clopidogrel (PLAVIX) 75 mg tab Take 75 mg by mouth.  acetaminophen (TYLENOL) 325 mg tablet Take 325 mg by mouth every four (4) hours as needed for Pain.  carvedilol (COREG) 6.25 mg tablet Take 6.25 mg by mouth two (2) times daily (with meals).  furosemide (LASIX) 20 mg tablet Take 20 mg by mouth daily.  gabapentin (NEURONTIN) 100 mg capsule Take 100 mg by mouth two (2) times a day.  pravastatin (PRAVACHOL) 20 mg tablet Take 20 mg by mouth nightly.  traMADol (ULTRAM) 50 mg tablet Take 50 mg by mouth every six (6) hours as needed for Pain.  albuterol (PROAIR HFA) 90 mcg/actuation inhaler Take  by inhalation.  tiotropium (SPIRIVA WITH HANDIHALER) 18 mcg inhalation capsule Take 1 Cap by inhalation daily.  nitroglycerin (NITROSTAT) 0.4 mg SL tablet 0.4 mg by SubLINGual route every five (5) minutes as needed for Chest Pain.  silver sulfADIAZINE (SILVADENE) 1 % topical cream Apply  to affected area daily. No current facility-administered medications for this visit. Health Maintenance Topic Date Due  
 DTaP/Tdap/Td series (1 - Tdap) 03/26/1963  Shingrix Vaccine Age 50> (1 of 2) 03/26/1992  GLAUCOMA SCREENING Q2Y  03/26/2007  Influenza Age 5 to Adult  08/01/2018  MEDICARE YEARLY EXAM  08/14/2018  Prostate-Specific Antigen  08/17/2019  Pneumococcal 65+ High/Highest Risk  Completed Immunization History Administered Date(s) Administered  Influenza High Dose Vaccine PF 11/09/2017  Influenza Vaccine (Tri) Adjuvanted 10/02/2018  Pneumococcal Conjugate (PCV-13) 09/26/2017  Pneumococcal Polysaccharide (PPSV-23) 11/10/2010 Allergies and Medications: Reviewed and updated in EMR. Patient Active Problem List  
Diagnosis Code  
 HTN (hypertension) I10  
 HLD (hyperlipidemia) E78.5  Chronic obstructive pulmonary disease ([facility] J44.9  Shellfish allergy Z91.013  
 Peripheral vascular disease (HCC) I73.9  Coronary artery disease I25.10  Anemia D64.9  
 History of renal insufficiency Z87.448  
 History of frostbite Z91.89  
 Cellulitis L03.90  Gynecomastia, male N58  
 Colon polyp K63.5  Pulmonary nodules R91.8  Health care maintenance Z00.00 Family History, Social History, Past Medical History, Surgical History: Reviewed and updated in EMR as appropriate. OBJECTIVE:  
Visit Vitals  /87  Pulse 100  Temp 96.3 °F (35.7 °C) (Oral)  Resp 16  
 Ht 5' 6.5\" (1.689 m)  SpO2 94% BP Readings from Last 3 Encounters:  
10/02/18 118/87  
08/21/18 127/86  
08/14/18 113/77 Wt Readings from Last 3 Encounters:  
04/04/18 122 lb (55.3 kg) 03/26/18 122 lb (55.3 kg) 03/13/18 140 lb (63.5 kg) General: Pleasant adult man sitting comfortably in wheelchair in no acute distress HEENT: Head is atraumatic normo-cephalic. Neuro: Alert and oriented x3 MSE: mood euthymic, affect congruent and reactive. Nursing Notes Reviewed. LABS/RADIOLOGICAL TESTS: 
 
CTA CHEST W OR W WO CONT (Accession [id number]) (Order [id number]) Allergies High: Betadine [Povidone-iodine];  Crab; Iodine; Shellfish Containing Products; Shellfish Derived; Shrimp Unspecified: Seafood Result Information Status Provider Status Final result (Exam End: 1/12/2018  6:12 PM) Open Study Result INDICATION: chest pain;  dyspnea TECHNIQUE: PE protocol chest CTA with IV contrast. Sagittal and coronal 
reconstructions. 3-D MIPs were performed. COMPARISON: None FINDINGS: 
  
Pulmonary arteries: No evidence of pulmonary embolism. Aorta: There is atherosclerotic calcification and ectasia of the aortic arch and 
descending thoracic aorta no focal dissection. Lungs: Central airways are patent. There are scattered areas of linear and 
dependent atelectasis seen throughout the lungs. There is 6 mm solid pulmonary 
nodule in the right upper lobe on image 33 series 5. Subpleural atelectatic 
change in the right posterior upper lobe. Pulmonary nodule in the left upper 
lobe measuring 7 mm. Pulmonary nodule measuring 4 mm in the left upper lobe. These are seen on image 41 series 5. Pulmonary nodule at the right lower lobe 
measuring 8 mm on image 91 series 5. Focal 6 mm pulmonary nodule in the right 
middle lobe on image 66 series 5. Lymph nodes: No significant mediastinal or hilar lymph node enlargement. Pleural spaces: No significant pleural effusion. Heart: No evidence of cardiomegaly. Thyroid: Unremarkable. Bones: No definite acute bony abnormality. Other discussion:  Bilateral adrenal gland thickening, nonspecific. Nodular 
contour of the liver. Cirrhosis is not excluded. IMPRESSION IMPRESSION: 
1. No evidence of pulmonary embolism. 2.  Bilateral pulmonary nodules. Largest nodule measures 8 mm in the right lower 
lobe. Surveillance using Fleischner criteria is recommended. 3.  Nodular contour of the liver. Cirrhosis is not excluded. 4.  Bilateral adrenal gland thickening., Nonspecific. 5.  Other findings as above. All lab results and radiological studies were reviewed and discussed with the patient. ASSESSMENT/PLAN:   
  ICD-10-CM ICD-9-CM 1. Gynecomastia, male N58 611.1 Continue to monitor Consider w/o for adrenal adenoma Encouraged to get blood work including liver and TSH testing Consider repeat living imaging with ultrasound if abnormal LFTs 2. Encounter for immunization Z23 V03.89 INFLUENZA VACCINE INACTIVATED (IIV), SUBUNIT, ADJUVANTED, IM 3. Cellulitis of lower extremity, unspecified laterality L03.119 682.6 F/u with podiatry Continue current mgmt 4. Coronary artery disease involving native heart without angina pectoris, unspecified vessel or lesion type I25.10 414.01 Continue current mgmt F/u with cardiology 5. Chronic obstructive pulmonary disease, unspecified COPD type ([facility] J44.9 496 Continue current mgmt F/uw with pulmonary 6. Peripheral vascular disease (HCC) I73.9 443.9 Continue current mgnt F/u with vascular 7. History of renal insufficiency Z87.448 V13.09 Get blood work 8. Pulmonary nodules R91.8 793.19 Repeat CT 1/2019  
9. Health care maintenance Z00.00 V70.0 Discuss next visit Requested Prescriptions No prescriptions requested or ordered in this encounter Patient verbalized understanding and agreement with the plan. Patient was given an after-visit summary. Follow-up Disposition: 
Return in about 10 weeks (around 12/11/2018) for GYNECOMASTIA. or sooner if worsening symptoms. More than 50% of this 25 min visit was spent counseling the patient face to face about etiology and treatment of health conditions outlined in assessment and plan. Laurie Choudhury M.D. [employer] [address] [place] [address], [address] [place], [address] [place], [place] [address] [phone] Fax - [phone]

## 2018-10-02 NOTE — PROGRESS NOTES
Chief Complaint Patient presents with  Follow-up  
  breast mass and test results  Medication Refill Plavix, lasix, pravachol 1. Have you been to the ER, urgent care clinic since your last visit? Hospitalized since your last visit? No 
 
2. Have you seen or consulted any other health care providers outside of the [address] [place] [address] since your last visit? Include any pap smears or colon screening. Dr Leonard Matter After obtaining consent, and per orders of Dr. Yue Ohara, injection of High Dose Flu Vaccine 0.5mL given IM in right deltoid by Keo Allison LPN. Patient instructed to remain in clinic for 20 minutes afterwards, and to report any adverse reaction to me immediately.

## 2018-10-02 NOTE — ACP (ADVANCE CARE PLANNING)
Advance Care Planning (ACP) Provider Note - Comprehensive Date of ACP Conversation: 10/02/18 Persons included in Conversation:  patient and family Length of ACP Conversation in minutes:  <16 minutes (Non-Billable) Authorized Decision Maker (if patient is incapable of making informed decisions): This person is: N/A General ACP for ALL Patients with Decision Making Capacity: 
 Importance of advance care planning, including choosing a healthcare agent to communicate patient's healthcare decisions if patient lost the ability to make decisions, such as after a sudden illness or accident Review of Existing Advance Directive: 
n/a For Serious or Chronic Illness: 
Understanding of medical condition Understanding of CPR, goals and expected outcomes, benefits and burdens discussed. Interventions Provided: 
Recommended completion of Advance Directive form after review of ACP materials and conversation with prospective healthcare agent

## 2018-10-10 NOTE — TELEPHONE ENCOUNTER
Requested Prescriptions     Pending Prescriptions Disp Refills    clopidogrel (PLAVIX) 75 mg tab       Sig: Take 1 Tab by mouth.  furosemide (LASIX) 20 mg tablet       Sig: Take 1 Tab by mouth daily.  pravastatin (PRAVACHOL) 20 mg tablet       Sig: Take 1 Tab by mouth nightly.

## 2018-11-01 NOTE — TELEPHONE ENCOUNTER
Requested Prescriptions     Pending Prescriptions Disp Refills    carvedilol (COREG) 6.25 mg tablet       Sig: Take 1 Tab by mouth two (2) times daily (with meals).  pravastatin (PRAVACHOL) 20 mg tablet 30 Tab 0     Sig: Take 1 Tab by mouth nightly.  clopidogrel (PLAVIX) 75 mg tab 30 Tab 0     Sig: Take 1 Tab by mouth daily.  furosemide (LASIX) 20 mg tablet 30 Tab 0     Sig: Take 1 Tab by mouth daily. Received call from pt daughter and was told she has been waiting on these Rx for patient since almost 2 weeks ago and the pharmacy gave her 8 pills for the pt to tide him over until order could be completed by provider. Pt's daughter stated she needs these Rx's to be filled now by the end of the day due to pt having no tabs left and to be notified as soon as this is complete.

## 2018-11-01 NOTE — TELEPHONE ENCOUNTER
LPN please call patient and inform rx sent to pharmacy on file. We did not get refill request for carvedilol until phone call from 11/1/2018. All 4 rx sent to pharmacy on file. Glenda Hayden M.D.   [place] [id number] [place] [address] [phone]

## 2018-11-30 NOTE — PROGRESS NOTES
Patient presents for lab draw ordered by:    Ordering Provider:  Linsey Farrar Department/Practice:  [facility]  Phone:  [phone]  Date Ordered:  8/2018    The following labs were drawn and sent to [facility]  by Sagar Junior LPN:    CBC, Lipid Profile, CMP, TSH, 3rd Generation and PSA    The following tubes were sent:     2 SST, 1Lav, 0Blue top, 0urine    Left AC cleansed using aseptic technique. Specimen obtained using a 21 gauge butterfly  with 1 attempt. Patient tolerated process well and there was no bleeding noted at site.

## 2018-11-30 NOTE — TELEPHONE ENCOUNTER
Received fax transmission from [facility] of encounter. Sent to be signed, sent, and scanned.

## 2018-12-05 NOTE — TELEPHONE ENCOUNTER
LPN please call patient and inform labs are essentially normal. Mild anemia, improved from prior. Increase iron in diet. Dawn Miranda M.D.   [address], [address] [place] [address] [phone] [place] [address] [phone]

## 2018-12-07 NOTE — TELEPHONE ENCOUNTER
Informed patient of labs are normal however still show mild anemia but improving from prior. Increase iron in his diet. Patient verbalized understanding of conversation.

## 2018-12-11 PROBLEM — R63.6 UNDERWEIGHT: Status: ACTIVE | Noted: 2018-01-01

## 2018-12-11 PROBLEM — G62.9 NEUROPATHY: Status: ACTIVE | Noted: 2018-01-01

## 2018-12-11 PROBLEM — L03.90 CELLULITIS: Status: RESOLVED | Noted: 2018-01-12 | Resolved: 2018-01-01

## 2018-12-11 PROBLEM — L08.9 FOOT INFECTION: Status: ACTIVE | Noted: 2018-01-01

## 2018-12-11 PROBLEM — R93.2 ABNORMAL CT OF LIVER: Status: ACTIVE | Noted: 2018-01-01

## 2018-12-11 PROBLEM — Z87.891 HISTORY OF TOBACCO USE: Status: ACTIVE | Noted: 2018-01-01

## 2018-12-11 PROBLEM — E27.8 ADRENAL HYPERPLASIA (HCC): Status: ACTIVE | Noted: 2018-01-01

## 2018-12-11 NOTE — PATIENT INSTRUCTIONS
Get urine and blood testing completed in next week, need to get blood work collected in morning between 7-8AM and 24 urine studies Call clinic with medicine refills you need by January We are sending a referral to [facility] for ultrasound of liver, CT scan of lungs to follow up abnormal imaging findings 1/2018 on CT of chest. You should be called about this in 1 weeks. If no word in 1 weeks please give us a call to follow up DUE FOR TDAP AND SHINGLES VACCINE, CALL INSURANCE TO ASK ABOUT COVERAGE, IF COVERED PLEASE GET FROM PHARMACY 
 
FOR GLAUCOMA SCREENING: 
Please call eye doctor to schedule appointment FOR NERVE PAIN IN FEET: 
Start taking gabapentin 300 mg three times a day If tiredness, confusion or falls related to increase dose call clinic and stop medicine FOR WEIGHT: 
Try to work on increasing weight with diet, healthy carb/fat/protien with every meal 
Okay to supplement with ensure FOR HEART: 
Call heart doctors office to schedule follow up with new provider Eating Healthy Foods: Care Instructions Your Care Instructions Eating healthy foods can help lower your risk for disease. Healthy food gives you energy and keeps your heart strong, your brain active, your muscles working, and your bones strong. A healthy diet includes a variety of foods from the basic food groups: grains, vegetables, fruits, milk and milk products, and meat and beans. Some people may eat more of their favorite foods from only one food group and, as a result, miss getting the nutrients they need. So, it is important to pay attention not only to what you eat but also to what you are missing from your diet. You can eat a healthy, balanced diet by making a few small changes. Follow-up care is a key part of your treatment and safety. Be sure to make and go to all appointments, and call your doctor if you are having problems.  It's also a good idea to know your test results and keep a list of the medicines you take. How can you care for yourself at home? Look at what you eat · Keep a food diary for a week or two and record everything you eat or drink. Track the number of servings you eat from each food group. · For a balanced diet every day, eat a variety of: 
? 6 or more ounce-equivalents of grains, such as cereals, breads, crackers, rice, or pasta, every day. An ounce-equivalent is 1 slice of bread, 1 cup of ready-to-eat cereal, or ½ cup of cooked rice, cooked pasta, or cooked cereal. 
? 2½ cups of vegetables, especially: § Dark-green vegetables such as broccoli and spinach. § Orange vegetables such as carrots and sweet potatoes. § Dry beans (such as wells and kidney beans) and peas (such as lentils). ? 2 cups of fresh, frozen, or canned fruit. A small apple or 1 banana or orange equals 1 cup. ? 3 cups of nonfat or low-fat milk, yogurt, or other milk products. ? 5½ ounces of meat and beans, such as chicken, fish, lean meat, beans, nuts, and seeds. One egg, 1 tablespoon of peanut butter, ½ ounce nuts or seeds, or ¼ cup of cooked beans equals 1 ounce of meat. · Learn how to read food labels for serving sizes and ingredients. Fast-food and convenience-food meals often contain few or no fruits or vegetables. Make sure you eat some fruits and vegetables to make the meal more nutritious. · Look at your food diary. For each food group, add up what you have eaten and then divide the total by the number of days. This will give you an idea of how much you are eating from each food group. See if you can find some ways to change your diet to make it more healthy. Start small · Do not try to make dramatic changes to your diet all at once. You might feel that you are missing out on your favorite foods and then be more likely to fail. · Start slowly, and gradually change your habits. Try some of the following: ? Use whole wheat bread instead of white bread. ? Use nonfat or low-fat milk instead of whole milk. ? Eat brown rice instead of white rice, and eat whole wheat pasta instead of white-flour pasta. ? Try low-fat cheeses and low-fat yogurt. ? Add more fruits and vegetables to meals and have them for snacks. ? Add lettuce, tomato, cucumber, and onion to sandwiches. ? Add fruit to yogurt and cereal. 
Enjoy food · You can still eat your favorite foods. You just may need to eat less of them. If your favorite foods are high in fat, salt, and sugar, limit how often you eat them, but do not cut them out entirely. · Eat a wide variety of foods. Make healthy choices when eating out · The type of restaurant you choose can help you make healthy choices. Even fast-food chains are now offering more low-fat or healthier choices on the menu. · Choose smaller portions, or take half of your meal home. · When eating out, try: ? A veggie pizza with a whole wheat crust or grilled chicken (instead of sausage or pepperoni). ? Pasta with roasted vegetables, grilled chicken, or marinara sauce instead of cream sauce. ? A vegetable wrap or grilled chicken wrap. ? Broiled or poached food instead of fried or breaded items. Make healthy choices easy · Buy packaged, prewashed, ready-to-eat fresh vegetables and fruits, such as baby carrots, salad mixes, and chopped or shredded broccoli and cauliflower. · Buy packaged, presliced fruits, such as melon or pineapple. · Choose 100% fruit or vegetable juice instead of soda. Limit juice intake to 4 to 6 oz (½ to ¾ cup) a day. · Blend low-fat yogurt, fruit juice, and canned or frozen fruit to make a smoothie for breakfast or a snack. Where can you learn more? Go to http://gene-eddie.info/. Enter T756 in the search box to learn more about \"Eating Healthy Foods: Care Instructions. \" Current as of: March 29, 2018 Content Version: 11.8 © 7366-6952 Healthwise, Incorporated.  Care instructions adapted under license by [address] (which disclaims liability or warranty for this information). If you have questions about a medical condition or this instruction, always ask your healthcare professional. [address] any warranty or liability for your use of this information. Body Mass Index: Care Instructions Your Care Instructions Body mass index (BMI) can help you see if your weight is raising your risk for health problems. It uses a formula to compare how much you weigh with how tall you are. · A BMI lower than 18.5 is considered underweight. · A BMI between 18.5 and 24.9 is considered healthy. · A BMI between 25 and 29.9 is considered overweight. A BMI of 30 or higher is considered obese. If your BMI is in the normal range, it means that you have a lower risk for weight-related health problems. If your BMI is in the overweight or obese range, you may be at increased risk for weight-related health problems, such as high blood pressure, heart disease, stroke, arthritis or joint pain, and diabetes. If your BMI is in the underweight range, you may be at increased risk for health problems such as fatigue, lower protection (immunity) against illness, muscle loss, bone loss, hair loss, and hormone problems. BMI is just one measure of your risk for weight-related health problems. You may be at higher risk for health problems if you are not active, you eat an unhealthy diet, or you drink too much alcohol or use tobacco products. Follow-up care is a key part of your treatment and safety. Be sure to make and go to all appointments, and call your doctor if you are having problems. It's also a good idea to know your test results and keep a list of the medicines you take. How can you care for yourself at home? · Practice healthy eating habits. This includes eating plenty of fruits, vegetables, whole grains, lean protein, and low-fat dairy. · If your doctor recommends it, get more exercise. Walking is a good choice. Bit by bit, increase the amount you walk every day. Try for at least 30 minutes on most days of the week. · Do not smoke. Smoking can increase your risk for health problems. If you need help quitting, talk to your doctor about stop-smoking programs and medicines. These can increase your chances of quitting for good. · Limit alcohol to 2 drinks a day for men and 1 drink a day for women. Too much alcohol can cause health problems. If you have a BMI higher than 25 · Your doctor may do other tests to check your risk for weight-related health problems. This may include measuring the distance around your waist. A waist measurement of more than 40 inches in men or 35 inches in women can increase the risk of weight-related health problems. · Talk with your doctor about steps you can take to stay healthy or improve your health. You may need to make lifestyle changes to lose weight and stay healthy, such as changing your diet and getting regular exercise. If you have a BMI lower than 18.5 · Your doctor may do other tests to check your risk for health problems. · Talk with your doctor about steps you can take to stay healthy or improve your health. You may need to make lifestyle changes to gain or maintain weight and stay healthy, such as getting more healthy foods in your diet and doing exercises to build muscle. Where can you learn more? Go to http://gene-eddie.info/. Enter S176 in the search box to learn more about \"Body Mass Index: Care Instructions. \" Current as of: October 13, 2016 Content Version: 11.4 © 9810-9040 Healthwise, Incorporated. Care instructions adapted under license by [employer] (which disclaims liability or warranty for this information).  If you have questions about a medical condition or this instruction, always ask your healthcare professional. [employer] disclaims any warranty or liability for your use of this information. Schedule of Personalized Health Plan The best way to stay healthy is to live a healthy lifestyle. A healthy lifestyle includes regular exercise, eating a well-balanced diet, keeping a healthy weight and not smoking. Regular physical exams and screening tests are another important way to take care of yourself. Preventive exams provided by health care providers can find health problems early when treatment works best and can keep you from getting certain diseases or illnesses. Preventive services include exams, lab tests, screenings, shots, monitoring and information to help you take care of your own health. All people over 65 should have a pneumonia shot. Pneumonia shots are usually only needed once in a lifetime unless your doctor decides differently. All people over 65 should have a yearly flu shot. People over 65 are at medium to high risk for Hepatitis B. Three shots are needed for complete protection. In addition to your physical exam, some screening tests are recommended: 
 
Bone mass measurement (dexa scan) is recommended every two years if you have certain risk factors, such as personal history of vertebral fracture or chronic steroid medication use Diabetes Mellitus screening is recommended every year. Glaucoma is an eye disease caused by high pressure in the eye. An eye exam is recommended every year. Cardiovascular screening tests that check your cholesterol and other blood fat (lipid) levels are recommended every five years. Colorectal Cancer screening tests help to find pre-cancerous polyps (growths in the colon) so they can be removed before they turn into cancer. Tests ordered for screening depend on your personal and family history risk factors.  
 
Screening for Prostate Cancer is recommended yearly with a digital rectal exam and/or a PSA test 
 
 Here is a list of your current Health Maintenance items with a due date: 
Health Maintenance Topic Date Due  
 DTaP/Tdap/Td series (1 - Tdap) 03/26/1963  Shingrix Vaccine Age 50> (1 of 2) 03/26/1992  GLAUCOMA SCREENING Q2Y  03/26/2007  MEDICARE YEARLY EXAM  12/12/2019  COLONOSCOPY  11/08/2027  Pneumococcal 65+ High/Highest Risk  Completed  Influenza Age 5 to Adult  Completed

## 2018-12-11 NOTE — PROGRESS NOTES
Internal Medicine Follow Up Visit Note Chief Complaint Patient presents with  Breast Mass  Abnormal Lab Results HPI:  Radha Weaver is a 70 y.o.  male with history significant for PAD, h/o frost bite s/p amputation and recurrent infection bilateral feet followed by podiatry, h/o tobacco use disorder, h/o CVA, CAD, HTN is here for the above complaint(s). Underweight: eating 3.5 meals a day, slowly gaining weight and strength. Can walk with walker. MRSA cellulitis bilateral feet;followed by podiatry dr Merle Barnes now on clindamycin TID, started yesterday. CTA Chest 1/2018: findings included Nodular contour of liver, bilateral adrenal glad thickening, lung nodules ~8 mm Gynecomastia: decreased size since last visit, no pain, no skin changes or nipple discharge. Current Outpatient Medications Medication Sig  
 gabapentin (NEURONTIN) 300 mg capsule Take 1 Cap by mouth three (3) times daily for 90 days.  carvedilol (COREG) 6.25 mg tablet Take 1 Tab by mouth two (2) times daily (with meals).  pravastatin (PRAVACHOL) 20 mg tablet Take 1 Tab by mouth nightly.  clopidogrel (PLAVIX) 75 mg tab Take 1 Tab by mouth daily.  furosemide (LASIX) 20 mg tablet Take 1 Tab by mouth daily.  therapeutic multivitamin (THERAGRAN) tablet Take 1 Tab by mouth daily.  ascorbic acid, vitamin C, (VITAMIN C) 500 mg tablet Take  by mouth.  vitamin e (E GEMS) 100 unit capsule Take  by mouth daily.  potassium 99 mg tablet Take 99 mg by mouth daily.  multivitamin (ONE A DAY) tablet Take 1 Tab by mouth daily.  aspirin 81 mg chewable tablet Take 81 mg by mouth daily.  docusate sodium (COLACE) 100 mg capsule Take 100 mg by mouth daily.  acetaminophen (TYLENOL) 325 mg tablet Take 325 mg by mouth every four (4) hours as needed for Pain.  nitroglycerin (NITROSTAT) 0.4 mg SL tablet 0.4 mg by SubLINGual route every five (5) minutes as needed for Chest Pain.  silver sulfADIAZINE (SILVADENE) 1 % topical cream Apply  to affected area daily.  traMADol (ULTRAM) 50 mg tablet Take 50 mg by mouth every six (6) hours as needed for Pain.  albuterol (PROAIR HFA) 90 mcg/actuation inhaler Take  by inhalation.  tiotropium (SPIRIVA WITH HANDIHALER) 18 mcg inhalation capsule Take 1 Cap by inhalation daily.  clindamycin (CLEOCIN) 150 mg capsule No current facility-administered medications for this visit. Health Maintenance Topic Date Due  
 DTaP/Tdap/Td series (1 - Tdap) 03/26/1963  Shingrix Vaccine Age 50> (1 of 2) 03/26/1992  GLAUCOMA SCREENING Q2Y  03/26/2007  MEDICARE YEARLY EXAM  12/12/2019  COLONOSCOPY  11/08/2027  Pneumococcal 65+ High/Highest Risk  Completed  Influenza Age 5 to Adult  Completed Immunization History Administered Date(s) Administered  Influenza High Dose Vaccine PF 11/09/2017  Influenza Vaccine 11/15/2017  Influenza Vaccine (Tri) Adjuvanted 10/02/2018  Pneumococcal Conjugate (PCV-13) 09/26/2017  Pneumococcal Polysaccharide (PPSV-23) 11/10/2010, 10/15/2017  Pneumococcal Vaccine (Unspecified Type) 11/01/2017 Allergies and Medications: Reviewed and updated in EMR. Patient Active Problem List  
Diagnosis Code  
 HTN (hypertension) I10  
 HLD (hyperlipidemia) E78.5  Chronic obstructive pulmonary disease ([facility] [address] J44.9  Shellfish allergy Z91.013  
 Peripheral vascular disease (HCC) I73.9  Coronary artery disease I25.10  Anemia D64.9  
 History of renal insufficiency Z87.448  
 History of frostbite Z91.89  Gynecomastia, male N58  
 Colon polyp K63.5  Pulmonary nodules R91.8  Health care maintenance Z00.00  Underweight R63.6  Foot infection L08.9  Abnormal CT of liver R93.2  Adrenal hyperplasia (HCC) E27.8  Neuropathy G62.9  
 History of tobacco use Z87.891 Family History, Social History, Past Medical History, Surgical History: Reviewed and updated in EMR as appropriate. OBJECTIVE:  
Visit Vitals /78 Pulse 72 Temp 96.3 °F (35.7 °C) (Oral) Resp 18 Ht 5' 6.5\" (1.689 m) Wt 115 lb 9.6 oz (52.4 kg) SpO2 91% Comment: ra BMI 18.38 kg/m² BP Readings from Last 3 Encounters:  
12/11/18 138/78  
10/02/18 118/87  
08/21/18 127/86 Wt Readings from Last 3 Encounters:  
12/11/18 115 lb 9.6 oz (52.4 kg) 04/04/18 122 lb (55.3 kg) 03/26/18 122 lb (55.3 kg) General: Pleasant adult man, thinly framed in no acute distress HEENT: Head is atraumatic normo-cephalic. CVS:  Heart regular, rate, and rhythm. Audible S1 and S2. No murmurs, rubs or gallops. PULM: Lungs clear to auscultation bilaterally. No wheezes, rales or rhonchi. GI: Positive bowel sounds, soft, nondistended, non-tender Breast: Decreased size of right breast compared to last visit. Neuro: Alert and oriented x3.wheelchair MSE: mood euthymic, affect congruent and reactive. Nursing Notes Reviewed. LABS/RADIOLOGICAL TESTS: 
 
Lab Results Component Value Date/Time WBC 6.4 11/30/2018 02:03 PM  
 HGB 12.0 (L) 11/30/2018 02:03 PM  
 HCT 38.4 11/30/2018 02:03 PM  
 PLATELET 756 08/90/8521 02:03 PM  
 
Lab Results Component Value Date/Time Sodium 140 11/30/2018 02:03 PM  
 Potassium 4.3 11/30/2018 02:03 PM  
 Chloride 105 11/30/2018 02:03 PM  
 CO2 29 11/30/2018 02:03 PM  
 Glucose 114 (H) 11/30/2018 02:03 PM  
 BUN 11 11/30/2018 02:03 PM  
 Creatinine 0.85 11/30/2018 02:03 PM  
 
Lab Results Component Value Date/Time Cholesterol, total 159 11/30/2018 02:03 PM  
 HDL Cholesterol 70 (H) 11/30/2018 02:03 PM  
 LDL, calculated 64.8 11/30/2018 02:03 PM  
 Triglyceride 121 11/30/2018 02:03 PM  
 
Results CTA CHEST W OR W WO CONT (Accession [id number]) (Order [id number]) Allergies     
 
High: Betadine [Povidone-iodine];  Crab; Iodine; Shellfish Containing Products; Shellfish Derived; Shrimp Unspecified: Seafood Result Information Status: Final result (Exam End: 1/12/2018 18:12) Provider Status: Open Study Result INDICATION: chest pain;  dyspnea 
  
TECHNIQUE: PE protocol chest CTA with IV contrast. Sagittal and coronal 
reconstructions. 3-D MIPs were performed. 
  
COMPARISON: None 
  
FINDINGS: 
  
Pulmonary arteries: No evidence of pulmonary embolism. 
  
Aorta: There is atherosclerotic calcification and ectasia of the aortic arch and 
descending thoracic aorta no focal dissection. 
  
Lungs: Central airways are patent. There are scattered areas of linear and 
dependent atelectasis seen throughout the lungs. There is 6 mm solid pulmonary 
nodule in the right upper lobe on image 33 series 5. Subpleural atelectatic 
change in the right posterior upper lobe. Pulmonary nodule in the left upper 
lobe measuring 7 mm. Pulmonary nodule measuring 4 mm in the left upper lobe. These are seen on image 41 series 5. Pulmonary nodule at the right lower lobe 
measuring 8 mm on image 91 series 5. Focal 6 mm pulmonary nodule in the right 
middle lobe on image 66 series 5. 
  
Lymph nodes: No significant mediastinal or hilar lymph node enlargement.     
  
Pleural spaces: No significant pleural effusion. 
  
Heart: No evidence of cardiomegaly. 
  
Thyroid: Unremarkable. 
  
Bones: No definite acute bony abnormality.     
  
Other discussion:  Bilateral adrenal gland thickening, nonspecific. Nodular 
contour of the liver. Cirrhosis is not excluded. 
  
    
  
IMPRESSION IMPRESSION: 
1. No evidence of pulmonary embolism. 
  
2.  Bilateral pulmonary nodules. Largest nodule measures 8 mm in the right lower 
lobe. Surveillance using Fleischner criteria is recommended. 
  
3. Nodular contour of the liver. Cirrhosis is not excluded. 
  
4.  Bilateral adrenal gland thickening., Nonspecific. 
  
5.  Other findings as above. 
  
  
Guidelines for Management of Incidental Pulmonary Nodules Detected on CT Images: From the Fleischner Society 2017 
  
SOLID NODULES 
  
Nodule Size:  < 6 mm Low-Risk Patient:  No CT followup needed. High-Risk Patient: Optional CT follow-up at 12 months Low-Risk Patient with multiple nodules: No CT follow-up needed. High-Risk Patient with multiple nodules: Optional CT follow-up at 12 months. 
  
Nodule Size: 6-8 mm Low-Risk Patient: CT at 6-12 months, then consider CT at 18-24 months. High-Risk Patient:  Initial followup at 6-12 months and then at 18-24 months if 
no change. Low-Risk Patient with multiple nodules: CT at 3-6 months, then consider CT at 
18-24 months if no change. High-Risk Patient with multiple nodules: CT at 3-6 months, then at 18-24 months 
if no change. 
  
Nodule Size:  >8 mm Low-Risk Patient: Consider CT, PET/CT, or tissue sampling at 3 months. High-Risk Patient:  Consider CT, PET/CT, or tissue sampling at 3 months. Low-Risk Patient with multiple nodules: CT at 3-6 months, then consider CT at 
18-24 months if no change. High-Risk Patient with multiple nodules: CT at 3-6 months, then at 18-24 months 
if no change. 
  
SUBSOLID NODULES: 
  
Nodule Size:  < 6 mm Groundglass: No CT followup needed. Part solid: No CT followup needed. Multiple nodules: CT at 3-6 months. If stable, consider CT at 2 and 4 years. 
  
Nodule size > 6 mm Groundglass: CT at 6-12 months to confirm presence. Then CT every 2 years until 5 years. Part solid: CT at 3-6 months to confirm presence. If unchanged and solid 
component remains less than 6 mm, annual CT should be performed for 5 years. Multiple nodules: CT at 3-6 months. Subsequent management based on the most 
suspicious nodule. 
  
PLEASE NOTE DISTINCTION BETWEEN SOLID AND SUBSOLID NODULES. 
   
 
 
 
All lab results and radiological studies were reviewed and discussed with the patient. ASSESSMENT/PLAN:   
  ICD-10-CM ICD-9-CM 1. Underweight: per wife/pt improving R63.6 783.22 Continue with healthy diet 2. Pulmonary nodules R91.8 793.19 CT LOW DOSE LUNG CANCER SCREENING 3. Foot infection L08.9 686.9 Continue current mgmt F/u with podiatry 4. Gynecomastia, male N58 611.1 Continue to monitor 5. Abnormal CT of liver R93.2 793.3  ABD LTD 6. History of tobacco use Z87.891 V15.82 CT LOW DOSE LUNG CANCER SCREENING 7. Adrenal hyperplasia (HCC) E27.8 255.8 DHEA SULFATE CORTISOL, AM  
   ACTH  
   METANEPHRINES FRACTIONATED, URINE 24 HR  
   CORTISOL, URINE FREE 24 HR  
8. Encounter for screening for lung cancer Z12.2 V76.0 CT LOW DOSE LUNG CANCER SCREENING 9. Neuropathy G62.9 355.9 gabapentin (NEURONTIN) 300 mg capsule increased TID 10. Coronary artery disease involving native heart without angina pectoris, unspecified vessel or lesion type I25.10 414.01 Continue current mgmt F/u with cardiology Requested Prescriptions Signed Prescriptions Disp Refills  gabapentin (NEURONTIN) 300 mg capsule 90 Cap 2 Sig: Take 1 Cap by mouth three (3) times daily for 90 days. Patient verbalized understanding and agreement with the plan. Patient was given an after-visit summary. Follow-up Disposition: 
Return in about 5 months (around 5/11/2019), or if symptoms worsen or fail to improve, for HTN, Weight. or sooner if worsening symptoms. More than 50% of this 40 min visit was spent counseling the patient face to face about etiology and treatment of health conditions outlined in assessment and plan. Nathaniel Farias M.D. [employer] [address] [place] [address], [address] [place], [address] [place] [address] - [phone] Fax - [phone] or [phone] MEDICARE WELLNESS NOTE BELOW Carlito Shaffer is a 70 y.o. male and presents for annual Medicare Wellness Visit. Problem List: Reviewed with patient and discussed risk factors. Patient Active Problem List  
Diagnosis Code  
 HTN (hypertension) I10  
 HLD (hyperlipidemia) E78.5  Chronic obstructive pulmonary disease ([facility] [address] J44.9  Shellfish allergy Z91.013  
 Peripheral vascular disease (HCC) I73.9  Coronary artery disease I25.10  Anemia D64.9  
 History of renal insufficiency Z87.448  
 History of frostbite Z91.89  Gynecomastia, male N58  
 Colon polyp K63.5  Pulmonary nodules R91.8  Health care maintenance Z00.00  Underweight R63.6  Foot infection L08.9  Abnormal CT of liver R93.2  Adrenal hyperplasia (HCC) E27.8  Neuropathy G62.9  
 History of tobacco use Z87.891 Current medical providers:  Patient Care Team: 
Melisa Negron MD as PCP - General (Internal Medicine) Dilip Lorenzo DPM as Physician (Podiatry) Saira Cosme MD as Physician (Vascular Surgery) Brayden Luna MD as Physician (Pulmonary Disease) PSH: Reviewed with patient Past Surgical History:  
Procedure Laterality Date  HX AMPUTATION FINGER    
 right 2nd 3rd digit  HX ORTHOPAEDIC    
 five toes on right foot removed.  HX ORTHOPAEDIC    
 foot surgery revision multiple since 2018,last 2018 SH: Reviewed with patient Social History Tobacco Use  Smoking status: Former Smoker Packs/day: 0.50 Years: 60.00 Pack years: 30.00 Types: Cigarettes Last attempt to quit: 2018 Years since quittin.9  Smokeless tobacco: Never Used  Tobacco comment: 2 PPD, started Substance Use Topics  Alcohol use: No  
  Comment: none since 2018  Drug use: No  
 
 
FH: Reviewed with patient Family History Problem Relation Age of Onset  Prostate Cancer Brother  Heart Disease Mother  Cancer Father Medications/Allergies: Reviewed with patient Current Outpatient Medications on File Prior to Visit Medication Sig Dispense Refill  carvedilol (COREG) 6.25 mg tablet Take 1 Tab by mouth two (2) times daily (with meals). 180 Tab 3  pravastatin (PRAVACHOL) 20 mg tablet Take 1 Tab by mouth nightly.  90 Tab 3  
 clopidogrel (PLAVIX) 75 mg tab Take 1 Tab by mouth daily. 90 Tab 3  furosemide (LASIX) 20 mg tablet Take 1 Tab by mouth daily. 90 Tab 3  therapeutic multivitamin (THERAGRAN) tablet Take 1 Tab by mouth daily.  ascorbic acid, vitamin C, (VITAMIN C) 500 mg tablet Take  by mouth.  vitamin e (E GEMS) 100 unit capsule Take  by mouth daily.  potassium 99 mg tablet Take 99 mg by mouth daily.  multivitamin (ONE A DAY) tablet Take 1 Tab by mouth daily.  aspirin 81 mg chewable tablet Take 81 mg by mouth daily.  docusate sodium (COLACE) 100 mg capsule Take 100 mg by mouth daily.  acetaminophen (TYLENOL) 325 mg tablet Take 325 mg by mouth every four (4) hours as needed for Pain.  nitroglycerin (NITROSTAT) 0.4 mg SL tablet 0.4 mg by SubLINGual route every five (5) minutes as needed for Chest Pain.  silver sulfADIAZINE (SILVADENE) 1 % topical cream Apply  to affected area daily.  traMADol (ULTRAM) 50 mg tablet Take 50 mg by mouth every six (6) hours as needed for Pain.  albuterol (PROAIR HFA) 90 mcg/actuation inhaler Take  by inhalation.  tiotropium (SPIRIVA WITH HANDIHALER) 18 mcg inhalation capsule Take 1 Cap by inhalation daily.  clindamycin (CLEOCIN) 150 mg capsule No current facility-administered medications on file prior to visit. Allergies Allergen Reactions  Betadine [Povidone-Iodine] Anaphylaxis and Not Reported This Time Pt denies betadine allergy  Crab Anaphylaxis  Iodine Anaphylaxis  Shellfish Containing Products Anaphylaxis  Shellfish Derived Anaphylaxis and Swelling  Shrimp Anaphylaxis  Seafood Other (comments) Objective: 
Visit Vitals /78 Pulse 72 Temp 96.3 °F (35.7 °C) (Oral) Resp 18 Ht 5' 6.5\" (1.689 m) Wt 115 lb 9.6 oz (52.4 kg) SpO2 91% Comment: ra BMI 18.38 kg/m² Body mass index is 18.38 kg/m². Physical Exam: sitting comfortably, no acute distress. Assessment of cognitive impairment: Alert and oriented x person, place, time and situation MMSE/MOCA: N/A Depression Screen: PHQ over the last two weeks 12/11/2018 Little interest or pleasure in doing things Not at all Feeling down, depressed, irritable, or hopeless Not at all Total Score PHQ 2 0 Fall Risk Assessment:   
Fall Risk Assessment, last 12 mths 12/11/2018 Able to walk? Yes Fall in past 12 months? No  
 
 
Functional Ability:  
Does the patient exhibit a steady gait? No, does not have walker, sitting in wheelchair, able to walk with FWW, PT twice a week How long did it take the patient to get up and walk from a sitting position? Not tested Is the patient self reliant?  (ie can do own laundry, meals, household chores)  No, gets help from wife Does the patient handle his/her own medications? No, wife helps Does the patient handle his/her own money? No, wife helps Is the patients home safe (ie good lighting, handrails on stairs and bath, etc.)? yes Did you notice or did patient express any hearing difficulties? no 
  
Did you notice or did patient express any vision difficulties?   no 
  
Were distance and reading eye charts used? yes Advance Care Planning:  
Patient was offered the opportunity to discuss advance care planning:  yes Does patient have an Advance Directive:  yes If no, did you provide information on Caring Connections?  no  
 
Plan:   
AVS given with HCM plan for the year Encouraged to return with ACP form Encouraged to schedule eye exam for glaucoma screening Orders Placed This Encounter  US ABD LTD  CT LOW DOSE LUNG CANCER SCREENING  
 DHEA SULFATE  CORTISOL, AM  
 ACTH  
 METANEPHRINES FRACTIONATED, URINE 24 HR  
 CORTISOL, URINE FREE 24 HR  
 clindamycin (CLEOCIN) 150 mg capsule  gabapentin (NEURONTIN) 300 mg capsule Health Maintenance Topic Date Due  
 DTaP/Tdap/Td series (1 - Tdap) 03/26/1963  Shingrix Vaccine Age 50> (1 of 2) 03/26/1992  GLAUCOMA SCREENING Q2Y  03/26/2007  MEDICARE YEARLY EXAM  12/12/2019  COLONOSCOPY  11/08/2027  Pneumococcal 65+ High/Highest Risk  Completed  Influenza Age 5 to Adult  Completed *Patient verbalized understanding and agreement with the plan. A copy of the After Visit Summary with personalized health plan was given to the patient today.

## 2018-12-11 NOTE — PROGRESS NOTES
Chief Complaint Patient presents with  Breast Mass  Abnormal Lab Results 1. Have you been to the ER, urgent care clinic since your last visit? Hospitalized since your last visit? No 
 
2. Have you seen or consulted any other health care providers outside of the [address] [place] [address] since your last visit? Include any pap smears or colon screening.  No

## 2019-01-01 ENCOUNTER — HOSPITAL ENCOUNTER (OUTPATIENT)
Dept: LAB | Age: 79
Discharge: HOME | End: 2019-01-23
Payer: MEDICARE

## 2019-01-01 ENCOUNTER — TELEPHONE (OUTPATIENT)
Dept: FAMILY MEDICINE CLINIC | Age: 79
End: 2019-01-01

## 2019-01-01 ENCOUNTER — OFFICE VISIT (OUTPATIENT)
Dept: FAMILY MEDICINE CLINIC | Age: 79
End: 2019-01-01

## 2019-01-01 ENCOUNTER — DOCUMENTATION ONLY (OUTPATIENT)
Dept: FAMILY MEDICINE CLINIC | Age: 79
End: 2019-01-01

## 2019-01-01 ENCOUNTER — HOSPITAL ENCOUNTER (OUTPATIENT)
Dept: LAB | Age: 79
Discharge: HOME | End: 2019-04-02
Payer: MEDICARE

## 2019-01-01 VITALS
RESPIRATION RATE: 18 BRPM | OXYGEN SATURATION: 91 % | WEIGHT: 130 LBS | DIASTOLIC BLOOD PRESSURE: 64 MMHG | HEART RATE: 74 BPM | BODY MASS INDEX: 20.4 KG/M2 | HEIGHT: 67 IN | SYSTOLIC BLOOD PRESSURE: 128 MMHG | TEMPERATURE: 97.2 F

## 2019-01-01 VITALS
SYSTOLIC BLOOD PRESSURE: 118 MMHG | OXYGEN SATURATION: 92 % | WEIGHT: 118.2 LBS | TEMPERATURE: 95.7 F | RESPIRATION RATE: 16 BRPM | HEIGHT: 67 IN | HEART RATE: 75 BPM | DIASTOLIC BLOOD PRESSURE: 81 MMHG | BODY MASS INDEX: 18.55 KG/M2

## 2019-01-01 DIAGNOSIS — E27.8 ADRENAL HYPERPLASIA (HCC): ICD-10-CM

## 2019-01-01 DIAGNOSIS — I73.9 PERIPHERAL VASCULAR DISEASE (HCC): ICD-10-CM

## 2019-01-01 DIAGNOSIS — I25.10 CORONARY ARTERY DISEASE INVOLVING NATIVE HEART WITHOUT ANGINA PECTORIS, UNSPECIFIED VESSEL OR LESION TYPE: ICD-10-CM

## 2019-01-01 DIAGNOSIS — J44.9 CHRONIC OBSTRUCTIVE PULMONARY DISEASE, UNSPECIFIED COPD TYPE (HCC): ICD-10-CM

## 2019-01-01 DIAGNOSIS — L30.9 DERMATITIS: ICD-10-CM

## 2019-01-01 DIAGNOSIS — Z01.818 PREOPERATIVE CLEARANCE: Primary | ICD-10-CM

## 2019-01-01 DIAGNOSIS — G62.9 NEUROPATHY: ICD-10-CM

## 2019-01-01 DIAGNOSIS — J45.40 MODERATE PERSISTENT ASTHMA, UNCOMPLICATED: ICD-10-CM

## 2019-01-01 DIAGNOSIS — I10 ESSENTIAL HYPERTENSION: ICD-10-CM

## 2019-01-01 DIAGNOSIS — Z01.818 PREOPERATIVE CLEARANCE: ICD-10-CM

## 2019-01-01 DIAGNOSIS — Z87.39 HISTORY OF OSTEOMYELITIS: ICD-10-CM

## 2019-01-01 DIAGNOSIS — G62.9 NEUROPATHY: Primary | ICD-10-CM

## 2019-01-01 LAB
A ALTERNATA IGE QN: <0.1 KU/L
A FUMIGATUS IGE QN: <0.1 KU/L
ALBUMIN SERPL-MCNC: 3.8 G/DL (ref 3.4–5)
ALBUMIN/GLOB SERPL: 0.9 {RATIO} (ref 0.8–1.7)
ALP SERPL-CCNC: 83 U/L (ref 45–117)
ALT SERPL-CCNC: 13 U/L (ref 16–61)
AMER ROACH IGE QN: 0.14 KU/L
AMER SYCAMORE IGE QN: <0.1 KU/L
ANION GAP SERPL CALC-SCNC: 2 MMOL/L (ref 3–18)
AST SERPL-CCNC: 11 U/L (ref 15–37)
BAHIA GRASS IGE QN: <0.1 KU/L
BASOPHILS # BLD: 0 K/UL (ref 0–0.1)
BASOPHILS # BLD: 0 K/UL (ref 0–0.1)
BASOPHILS NFR BLD: 0 % (ref 0–2)
BASOPHILS NFR BLD: 1 % (ref 0–2)
BERMUDA GRASS IGE QN: <0.1 KU/L
BILIRUB SERPL-MCNC: 0.6 MG/DL (ref 0.2–1)
BOXELDER IGE QN: <0.1 KU/L
BUN SERPL-MCNC: 14 MG/DL (ref 7–18)
BUN/CREAT SERPL: 13 (ref 12–20)
C HERBARUM IGE QN: <0.1 KU/L
CALCIUM SERPL-MCNC: 9 MG/DL (ref 8.5–10.1)
CAT DANDER IGG QN: <0.1 KU/L
CHLORIDE SERPL-SCNC: 106 MMOL/L (ref 100–108)
CLASS DESCRIPTION, 600268: ABNORMAL
CO2 SERPL-SCNC: 31 MMOL/L (ref 21–32)
COMMON RAGWEED IGE QN: <0.1 KU/L
CREAT SERPL-MCNC: 1.09 MG/DL (ref 0.6–1.3)
D FARINAE IGE QN: 0.92 KU/L
D PTERONYSS IGE QN: 0.6 KU/L
DEPRECATED IGE QN: <0.1 KU/L
DIFFERENTIAL METHOD BLD: ABNORMAL
DIFFERENTIAL METHOD BLD: ABNORMAL
DOG DANDER IGE QN: <0.1 KU/L
ENGL PLANTAIN IGE QN: <0.1 KU/L
EOSINOPHIL # BLD: 0.2 K/UL (ref 0–0.4)
EOSINOPHIL # BLD: 0.3 K/UL (ref 0–0.4)
EOSINOPHIL NFR BLD: 2 % (ref 0–5)
EOSINOPHIL NFR BLD: 3 % (ref 0–5)
ERYTHROCYTE [DISTWIDTH] IN BLOOD BY AUTOMATED COUNT: 13.7 % (ref 11.6–14.5)
ERYTHROCYTE [DISTWIDTH] IN BLOOD BY AUTOMATED COUNT: 14.1 % (ref 11.6–14.5)
GLOBULIN SER CALC-MCNC: 4.2 G/DL (ref 2–4)
GLUCOSE SERPL-MCNC: 92 MG/DL (ref 74–99)
HCT VFR BLD AUTO: 39.5 % (ref 36–48)
HCT VFR BLD AUTO: 41.1 % (ref 36–48)
HGB BLD-MCNC: 12.3 G/DL (ref 13–16)
HGB BLD-MCNC: 12.5 G/DL (ref 13–16)
IGE SERPL-ACNC: 158 IU/ML (ref 6–495)
INR PPP: 1.1 (ref 0.8–1.2)
JOHNSON GRASS IGE QN: <0.1 KU/L
LYMPHOCYTES # BLD: 1.9 K/UL (ref 0.9–3.6)
LYMPHOCYTES # BLD: 2.1 K/UL (ref 0.9–3.6)
LYMPHOCYTES NFR BLD: 21 % (ref 21–52)
LYMPHOCYTES NFR BLD: 29 % (ref 21–52)
M RACEMOSUS IGE QN: <0.1 KU/L
MCH RBC QN AUTO: 27.1 PG (ref 24–34)
MCH RBC QN AUTO: 27.8 PG (ref 24–34)
MCHC RBC AUTO-ENTMCNC: 30.4 G/DL (ref 31–37)
MCHC RBC AUTO-ENTMCNC: 31.1 G/DL (ref 31–37)
MCV RBC AUTO: 89.2 FL (ref 74–97)
MCV RBC AUTO: 89.2 FL (ref 74–97)
MONOCYTES # BLD: 0.4 K/UL (ref 0.05–1.2)
MONOCYTES # BLD: 0.6 K/UL (ref 0.05–1.2)
MONOCYTES NFR BLD: 6 % (ref 3–10)
MONOCYTES NFR BLD: 7 % (ref 3–10)
MT JUNIPER IGE QN: <0.1 KU/L
MUGWORT IGE QN: <0.1 KU/L
NETTLE IGE QN: <0.1 KU/L
NEUTS SEG # BLD: 4.5 K/UL (ref 1.8–8)
NEUTS SEG # BLD: 6.3 K/UL (ref 1.8–8)
NEUTS SEG NFR BLD: 62 % (ref 40–73)
NEUTS SEG NFR BLD: 69 % (ref 40–73)
P NOTATUM IGE QN: <0.1 KU/L
PLATELET # BLD AUTO: 255 K/UL (ref 135–420)
PLATELET # BLD AUTO: 280 K/UL (ref 135–420)
PMV BLD AUTO: 8.6 FL (ref 9.2–11.8)
PMV BLD AUTO: 9.1 FL (ref 9.2–11.8)
POTASSIUM SERPL-SCNC: 4.1 MMOL/L (ref 3.5–5.5)
PROT SERPL-MCNC: 8 G/DL (ref 6.4–8.2)
PROTHROMBIN TIME: 14.2 SEC (ref 11.5–15.2)
RBC # BLD AUTO: 4.43 M/UL (ref 4.7–5.5)
RBC # BLD AUTO: 4.61 M/UL (ref 4.7–5.5)
S BOTRYOSUM IGE QN: <0.1 KU/L
SHEEP SORREL IGE QN: <0.1 KU/L
SODIUM SERPL-SCNC: 139 MMOL/L (ref 136–145)
SWEET GUM IGE QN: <0.1 KU/L
TIMOTHY IGE QN: <0.1 KU/L
WBC # BLD AUTO: 7.2 K/UL (ref 4.6–13.2)
WBC # BLD AUTO: 9.1 K/UL (ref 4.6–13.2)
WHITE BIRCH IGE QN: <0.1 KU/L
WHITE ELM IGG QN: <0.1 KU/L
WHITE HICKORY IGE QN: <0.1 KU/L
WHITE MULBERRY IGE QN: <0.1 KU/L
WHITE OAK IGE QN: <0.1 KU/L

## 2019-01-01 PROCEDURE — 85610 PROTHROMBIN TIME: CPT

## 2019-01-01 PROCEDURE — 80053 COMPREHEN METABOLIC PANEL: CPT

## 2019-01-01 PROCEDURE — 85025 COMPLETE CBC W/AUTO DIFF WBC: CPT

## 2019-01-01 PROCEDURE — 36415 COLL VENOUS BLD VENIPUNCTURE: CPT

## 2019-01-01 PROCEDURE — 82785 ASSAY OF IGE: CPT

## 2019-01-01 PROCEDURE — 86003 ALLG SPEC IGE CRUDE XTRC EA: CPT

## 2019-01-01 RX ORDER — GABAPENTIN 300 MG/1
300 CAPSULE ORAL 3 TIMES DAILY
Qty: 270 CAP | Refills: 1 | Status: SHIPPED | OUTPATIENT
Start: 2019-01-01 | End: 2019-11-30

## 2019-01-01 RX ORDER — GABAPENTIN 300 MG/1
CAPSULE ORAL
Qty: 90 CAP | Refills: 2 | Status: CANCELLED | OUTPATIENT
Start: 2019-01-01 | End: 2019-11-19

## 2019-01-01 RX ORDER — FUROSEMIDE 20 MG/1
20 TABLET ORAL DAILY
Qty: 90 TAB | Refills: 3 | Status: CANCELLED | OUTPATIENT
Start: 2019-01-01 | End: 2020-05-22

## 2019-01-10 NOTE — TELEPHONE ENCOUNTER
Pre op paperwork received by [facility]. States on form pt is scheduled to have surgery on 02/05/19. Not sure if pt has been cleared or of he need an appt for pre op.

## 2019-01-14 NOTE — TELEPHONE ENCOUNTER
Lvm for patient's wife to return call in regards to scheduling an appt for patient to have pre op scheduled/blood work and EKG. Lvm for [facility] to fax over preop paperwork for patient. Name and number given.

## 2019-01-14 NOTE — TELEPHONE ENCOUNTER
Pt's wife following up on paperwork received form [facility] podiatry. Pt's wife states not able to make appt with pt's cardiology for ekg. States really hard to move him around by herself. States pt is in a wheel chair and want to have everything done for pre op at time of appt blood work and EKG.

## 2019-01-23 NOTE — TELEPHONE ENCOUNTER
Will discuss CT results with patient at appointment tomorrow. Realized patient had apt with me tomorrow after opening note.

## 2019-01-23 NOTE — PROGRESS NOTES
Preoperative Evaluation    Date of Exam: 01/23/2019       Alex Regan  Is a 70 y.o.  male  who presents for preoperative evaluation at the request of Dr. Katy Wetzel for planned procedure: Partial Osteotomy on left 1st toe. Chief Complaint   Patient presents with    Pre-op Exam     [facility]-removing bone from left great toe on 2/5/19-Dr Katy Wetzel     Past Medical History:   Diagnosis Date    Chronic obstructive pulmonary disease ([address]     Coronary artery disease     Foot infection 08/09/2018    right foot revision of bone done by podiatry Dr. Aurora Jean Frostbite 01/2018    Right 2nd and 3rd finger, all toes    Gangrene ([address]     d/t frost bite - right and and both feet    HLD (hyperlipidemia)     HTN (hypertension)     Hypertension     Peripheral vascular disease ([facility]     Shellfish allergy      Patient here today for medications clearance for ongoing toe infection. Patient had surgery on his left great toe about a year ago. He has had subsequent infections since this surgery, to include MRSA. Podiatry has been managing up until this point, but they hope to eradicate infection with surgery. Patient denies fever/chills, chest pain, abdominal pain, n/v/d. Procedure/Surgery: Partial osteotomy- left toe  Date of Procedure/Surgery: 2/5/2018   Surgeon: Dr. Manda Pickett: [facility]  Primary Physician:  Dr. Jerry Galo/Mireya Carvajal PA-C   Latex Allergy: no    Recent use of: ASA    Tetanus up to date: 01/23/2019    Cardiac factors include:  Prior MI/CAD - 2018  Age>70   Cardiologist - [facility]    Anesthesia Complications: None  History of abnormal bleeding : None  History of Blood Transfusions: no  Health Care Directive or Living Will: no    REVIEW OF SYSTEMS:    General: negative for - chills, fatigue, fever, weight change  Psych: negative for - anxiety, depression, irritability or mood swings  ENT: negative for - headaches, hearing change, nasal congestion, oral lesions, sneezing or sore throat  Heme/ Lymph: negative for - bleeding problems, bruising, pallor or swollen lymph nodes  Endo: negative for - hot flashes, polydipsia/polyuria or temperature intolerance  Resp: negative for - cough, shortness of breath or wheezing  CV: negative for - chest pain, edema or palpitations  GI: negative for - abdominal pain, change in bowel habits, constipation, diarrhea or nausea/vomiting  : negative for - dysuria, hematuria, incontinence, pelvic pain or vulvar/vaginal symptoms  MSK: negative for - joint swelling or muscle pain  Neuro: negative for - confusion, headaches, seizures or weakness  Derm: negative for - dry skin, hair changes, rash or skin lesion changes    EXAM:   Vitals:    01/23/19 0951   BP: 118/81   Pulse: 75   Resp: 16   Temp: 95.7 °F (35.4 °C)   TempSrc: Oral   SpO2: 92%   Weight: 118 lb 3.2 oz (53.6 kg)   Height: 5' 6.5\" (1.689 m)   PainSc:   4   PainLoc: Foot     Physical Exam   Constitutional: He is oriented to person, place, and time and well-developed, well-nourished, and in no distress. Neck: Normal carotid pulses, no hepatojugular reflux and no JVD present. No spinous process tenderness and no muscular tenderness present. Carotid bruit is not present. Neck rigidity present. Decreased range of motion present. No edema and no erythema present. No thyroid mass and no thyromegaly present. Decreased range of motion with extension   Cardiovascular: Normal rate, regular rhythm, normal heart sounds and intact distal pulses. Exam reveals no gallop and no friction rub. No murmur heard. Pulmonary/Chest: Effort normal and breath sounds normal.   Abdominal: Soft. Bowel sounds are normal.   Musculoskeletal:        Right shoulder: He exhibits normal range of motion. Neurological: He is alert and oriented to person, place, and time. Gait normal. GCS score is 15. Skin: Skin is warm and dry.    Psychiatric: Mood, memory, affect and judgment normal.   Vitals reviewed. Visit Vitals  /81   Pulse 75   Temp 95.7 °F (35.4 °C) (Oral)   Resp 16   Ht 5' 6.5\" (1.689 m)   Wt 118 lb 3.2 oz (53.6 kg)   SpO2 92%   BMI 18.79 kg/m²         Labwork and Ancillary Studies:    CBC w/Diff  Lab Results   Component Value Date/Time    WBC 6.4 11/30/2018 02:03 PM    HCT 38.4 11/30/2018 02:03 PM    MCV 86.9 11/30/2018 02:03 PM        Basic Metabolic Profile  Lab Results   Component Value Date     11/30/2018    CO2 29 11/30/2018    BUN 11 11/30/2018       EKG:  See attached,     Assessment/Plan:    The patient is at moderate to seveere risk for planned procedure. Recommendations:  - Chest X-ray secondary to COPD history. Patient declines. Counseled patient on risks and possible respiratory complications after anesthesia. Patient verbalizes understanding of consequences. The patient has a risk for pulmonary failure. Aggressive pulmonary toilet post-operatively is recommended. - Cardiac clearance secondary to recent myocardial infarction. Will call to schedule cariology appointment for clearance. The following recommendations were made for medication regimen prior to surgery:  Stop ASA one week prior to surgery  Discontinue Plavix 5 days prior to surgery. Restart Plavix as soon as possible after surgery with a 300mg loading dose, then continue with 75mg daily. Continue taking HTN meds prior to surgery. Non-statin lipid-lowering agents should be held on the day prior to surgery. Hold NSAIDs 3 days prior to surgery. IMPRESSION:   Hypertension - stable  Hyperlipidemia - stable  Coronary artery disease - control uncertain  Peripheral vascular disease - control uncertain  Follow-up with cardiology for preoperative clearance. Mireya Hernandez PA-C/Idania Martínez MD

## 2019-01-23 NOTE — TELEPHONE ENCOUNTER
[facility]ed [facility] to see if patient can be seen sooner than 2/4/2019 because he is in need of cardiac clearance for his foot surgery scheduled for 2/5/2019. Was informed that Dr Ab Santa is unable to see patient any sooner that 2/4/2019 because patient is being seen by them for CABG evaluation. Spoke to patient's wife to inform her that the cardiologist is unable to see patient any sooner that 2/4/2019. Wife was a little upset but verbalized understanding. Routed to provider for review.

## 2019-01-23 NOTE — PROGRESS NOTES
Chief Complaint   Patient presents with    Pre-op Exam     [facility]-removing bone from left great toe on 2/5/19-Dr Katheran Duane     1. Have you been to the ER, urgent care clinic since your last visit? Hospitalized since your last visit? No    2. Have you seen or consulted any other health care providers outside of the [address] [place] [address] since your last visit? Include any pap smears or colon screening. Dr Lynsey King     After obtaining consent, and per orders of Juanita MELCHOR, injection of TdaP 0.5mL given IM in right deltoid by Mookie Avila LPN. Patient instructed to remain in clinic for 20 minutes afterwards, and to report any adverse reaction to me immediately.

## 2019-01-24 NOTE — TELEPHONE ENCOUNTER
Azucena Salinas with [facility] requesting dx code and signature for prothrombin time. States please fax info to [phone].

## 2019-02-07 NOTE — TELEPHONE ENCOUNTER
Received fax transmission from [facility] Visit. Sent to be signed, sent, and scanned.

## 2019-02-07 NOTE — TELEPHONE ENCOUNTER
Pt's wife  was told by Dr Dianne Le office awaiting pre op clearance from office. Advised Mrs Narendra Mendoza per nurse elizabeth ortiz notes waiting for pt to be cleared from cardiology. Mrs Lisette bernal pt has seen cardiology on Monday.  has EKG done that day and yesterday pt had echocardiogram.   was told by cardiology all results would be faxed to Dr Dianne Le office.

## 2019-02-13 NOTE — TELEPHONE ENCOUNTER
Pt's wife called and wants Nurse Junaid Armendariz to call her and go over what meds he is supposed to stop before his foot surgery on Monday.

## 2019-02-14 NOTE — TELEPHONE ENCOUNTER
Spoke to patient's wife. Need to know if there are any meds patient needs to stop prior to surgery. If so, which ones and how long before surgery.

## 2019-02-14 NOTE — TELEPHONE ENCOUNTER
Pt's wife following up on meds pt need to be off of before pt's surgery. . Wife states pt's surgery is on tue 02/19/19 and has stopped some meds yesterday due to blood clotting meds. States has to call hospital with the information. States please call back ASAP. To discuss.

## 2019-02-14 NOTE — TELEPHONE ENCOUNTER
Spoke with patient's wife and she would like to know what medication needs to be stopped prior to patient's surgery next Tuesday. Wife stated patient is on Lasix, Aspirin, Vit E and Plavix. Informed her the doctor office that is performing his surgery should of given them a list of instructions on medication to stop prior to surgery and they should have a list of his medication. Patient's wife stated they told her to call our office. Informed Mrs. Lisette Perez will send a message to provider in office and I will call her at end of the day.

## 2019-02-15 NOTE — TELEPHONE ENCOUNTER
Spoke with patient's wife regarding medications. She notes that she lost preoperative clearance paperwork that I gave her in the office on the 23rd. Patient's wife states that she has been holding the plavix for the past 2 days.  Advised her to continue holding plavix and start holding aspirin

## 2019-02-15 NOTE — TELEPHONE ENCOUNTER
Patient's wife informed of medications that patient should stop prior to surgery. Verbalized understanding of the information. Left message with Dr Estrellita Dancer ([phone]) to see if they have everything they need from our office for patient's procedure on Tuesday. Waiting a return call.

## 2019-02-15 NOTE — TELEPHONE ENCOUNTER
Note got cut off. ..start holding aspirin 3 days prior to his surgery. Also told patient to restart plavix immediately after surgery with a loading dose of 150mg and then back to 75mg daily.  Patient verbalized understanding,

## 2019-05-23 NOTE — PROGRESS NOTES
Internal Medicine Follow Up Visit Note Chief Complaint Patient presents with  Medication Refill HPI:  Liza Ty is a 70 y.o.  male is here for the above complaint(s). Osteomyelitis foot: followed by Dr. Bong Lee. Irish Horan \"grabs me\" pain in left foot associated with infection. Taking tramadol which is helpful for both feet. Tenderness in right foot when walking/standing. Taking antibiotic as prescribed. Has f/u with Dr. Bong Lee scheduled. Neuropathy: no help from gabapentin 300 mg three times a day, was not helpful at 100 mg twice a day dosing prior to incresae. CAD: No chest pain at rest or with exertion. COPD:  treated with albuterol and spiriva. No significant SOB or wheezing. No fevers or chills. Skin Bumps: red raised bumps on bilateral arms no itching, no pain, no swelling. Adrenal Hyperplasia: has not obtained testing, encouraged to complete work up, pt voiced agreement with plan. Discussed writer leaving practice July 2019. Current Outpatient Medications Medication Sig  
 gabapentin (NEURONTIN) 300 mg capsule TAKE 1 CAPSULE BY MOUTH THREE TIMES DAILY  clindamycin (CLEOCIN) 150 mg capsule  carvedilol (COREG) 6.25 mg tablet Take 1 Tab by mouth two (2) times daily (with meals).  pravastatin (PRAVACHOL) 20 mg tablet Take 1 Tab by mouth nightly.  clopidogrel (PLAVIX) 75 mg tab Take 1 Tab by mouth daily.  furosemide (LASIX) 20 mg tablet Take 1 Tab by mouth daily.  therapeutic multivitamin (THERAGRAN) tablet Take 1 Tab by mouth daily.  ascorbic acid, vitamin C, (VITAMIN C) 500 mg tablet Take  by mouth.  vitamin e (E GEMS) 100 unit capsule Take  by mouth daily.  potassium 99 mg tablet Take 99 mg by mouth daily.  multivitamin (ONE A DAY) tablet Take 1 Tab by mouth daily.  aspirin 81 mg chewable tablet Take 81 mg by mouth daily.  docusate sodium (COLACE) 100 mg capsule Take 100 mg by mouth daily.  acetaminophen (TYLENOL) 325 mg tablet Take 325 mg by mouth every four (4) hours as needed for Pain.  nitroglycerin (NITROSTAT) 0.4 mg SL tablet 0.4 mg by SubLINGual route every five (5) minutes as needed for Chest Pain.  traMADol (ULTRAM) 50 mg tablet Take 50 mg by mouth every six (6) hours as needed for Pain.  albuterol (PROAIR HFA) 90 mcg/actuation inhaler Take  by inhalation.  tiotropium (SPIRIVA WITH HANDIHALER) 18 mcg inhalation capsule Take 1 Cap by inhalation daily. No current facility-administered medications for this visit. Health Maintenance Topic Date Due  Shingrix Vaccine Age 50> (1 of 2) 03/26/1992  GLAUCOMA SCREENING Q2Y  03/26/2007  Influenza Age 5 to Adult  08/01/2019  MEDICARE YEARLY EXAM  12/12/2019  COLONOSCOPY  11/08/2027  
 DTaP/Tdap/Td series (2 - Td) 01/23/2029  Pneumococcal 65+ years  Completed Immunization History Administered Date(s) Administered  Influenza High Dose Vaccine PF 11/09/2017  Influenza Vaccine 11/15/2017  Influenza Vaccine (Tri) Adjuvanted 10/02/2018  Pneumococcal Conjugate (PCV-13) 09/26/2017  Pneumococcal Polysaccharide (PPSV-23) 11/10/2010, 10/15/2017  Pneumococcal Vaccine (Unspecified Type) 11/01/2017  Tdap 01/23/2019 Allergies and Medications: Reviewed and updated in EMR. Patient Active Problem List  
Diagnosis Code  
 HTN (hypertension) I10  
 HLD (hyperlipidemia) E78.5  Chronic obstructive pulmonary disease ([facility] [address] J44.9  Shellfish allergy Z91.013  
 Peripheral vascular disease (HCC) I73.9  Coronary artery disease I25.10  Anemia D64.9  
 History of renal insufficiency Z87.448  
 History of frostbite Z91.89  Gynecomastia, male N58  
 Colon polyp K63.5  Pulmonary nodules R91.8  Health care maintenance Z00.00  Underweight R63.6  Foot infection L08.9  Abnormal CT of liver R93.2  Adrenal hyperplasia (HCC) E27.8  Neuropathy G62.9  History of tobacco use Z87.891 Family History, Social History, Past Medical History, Surgical History: Reviewed and updated in EMR as appropriate. OBJECTIVE:  
Visit Vitals /64 Pulse 74 Temp 97.2 °F (36.2 °C) (Oral) Resp 18 Ht 5' 6.5\" (1.689 m) Wt 130 lb (59 kg) SpO2 91% BMI 20.67 kg/m² BP Readings from Last 3 Encounters:  
05/23/19 128/64  
01/23/19 118/81  
12/11/18 138/78 Wt Readings from Last 3 Encounters:  
05/23/19 130 lb (59 kg) 01/23/19 118 lb 3.2 oz (53.6 kg) 01/14/19 122 lb (55.3 kg) General: Pleasant elderly man in no acute distress. sitting in wheelchair HEENT: Head is atraumatic normo-cephalic. CVSHeart regular, rate, and rhythm. Audible S1 and S2. No murmurs, rubs or gallops. PULM: Lungs clear to auscultation bilaterally. No wheezes, rales or rhonchi. GI: Positive bowel sounds, soft, nondistended, non-tender. EXT: 2+ radial pulses bilaterally. No pedal edema bilaterally Neuro: Alert and oriented x 3, sitting in wheelchair. Skin: small red papules diffuse upper arms, non tender, no erythema or wamrth. No fluctuance. MSE: mood euthymic, affect congruent and reactive. Nursing Notes Reviewed. LABS/RADIOLOGICAL TESTS: 
 
Lab Results Component Value Date/Time WBC 6.7 05/16/2019 12:46 PM  
 HGB 12.4 05/16/2019 12:46 PM  
 HCT 39.9 05/16/2019 12:46 PM  
 PLATELET 649 35/70/5945 12:46 PM  
 
Lab Results Component Value Date/Time Sodium 139 01/23/2019 11:17 AM  
 Potassium 4.1 01/23/2019 11:17 AM  
 Chloride 106 01/23/2019 11:17 AM  
 CO2 31 01/23/2019 11:17 AM  
 Glucose 92 01/23/2019 11:17 AM  
 BUN 14 01/23/2019 11:17 AM  
 Creatinine 1.09 01/23/2019 11:17 AM  
 
Lab Results Component Value Date/Time  Cholesterol, total 159 11/30/2018 02:03 PM  
 HDL Cholesterol 70 (H) 11/30/2018 02:03 PM  
 LDL, calculated 64.8 11/30/2018 02:03 PM  
 Triglyceride 121 11/30/2018 02:03 PM  
 
 All lab results and radiological studies were reviewed and discussed with the patient. ASSESSMENT/PLAN:   
  ICD-10-CM ICD-9-CM 1. Neuropathy G62.9 355.9 D/c gabapentin 2. Essential hypertension I10 401.9 Continue current mgmt 3. Coronary artery disease involving native heart without angina pectoris, unspecified vessel or lesion type I25.10 414.01 Continue current mgmt 4. Peripheral vascular disease (HCC) I73.9 443.9 Continue current mgmt 5. Adrenal hyperplasia ([facility] E27.8 255.8 Work up labs ordered and given to patient signed 6. Chronic obstructive pulmonary disease, unspecified COPD type ([facility] [address] J44.9 496 Continue current mgmt 7. Dermatitis L30.9 692.9 Monitor Given return precautions 8. History of osteomyelitis Z87.39 V13.59 Continue current mgmt F/u with podiatrist Dr. Pietro Ngo Requested Prescriptions No prescriptions requested or ordered in this encounter Patient verbalized understanding and agreement with the plan. Patient was given an after-visit summary. Follow-up and Dispositions · Return in about 3 months (around 8/23/2019) for HTN, HLD, CAD. or sooner if worsening symptoms. More than 50% of this 40 min visit was spent counseling the patient face to face about etiology and treatment of health conditions outlined in assessment and plan. William Whittaker M.D. [employer] [address] [place] [address], [address] [place], [address] [place] [address] - [phone] Fax - [phone] or [phone]

## 2019-05-23 NOTE — PROGRESS NOTES
Chief Complaint Patient presents with  Medication Refill [place] .1. Have you been to the ER, urgent care clinic since your last visit? Hospitalized since your last visit? Yes Reason for visit: follow up 2. Have you seen or consulted any other health care providers outside of the [address] [place] [address] since your last visit? Include any pap smears or colon screening. Yes Where: Vascular Dr. Gabriel Vallecillo

## 2019-05-23 NOTE — PATIENT INSTRUCTIONS
GET MORNING BLOOD WORK FROM [facility] OR [facility], WILL NEED TO CALL LAB AND LET KNOW WHAT TEST YOU ARE GETTING DONE TO ASK IF YOU NEED TO SCHEDULE OR IF YOU CAN JUST WALK IN 
 
PLEASE GET GLAUCOMA SCREENING COMPLETED 
 
CALL PHARMACY ON THE FIRST OF MAY AND SPEAK TO REPRESENTATIVE TO ASK FOR REFILL OF LASIX PRESCRIBED BY DR. Syd Castrejon CONTINUE CURRENT PAIN MEDICINE 
 
WATCH BUMPS ON ARM, IF PAINFUL, ITCHING OR GETTING BIGGER OR MORE RED RETURN TO CLINIC TO DISCUSS

## 2019-05-31 NOTE — TELEPHONE ENCOUNTER
Pet pt's wife pt need to be placed back on this medication. States will need another refill with refill attached until pt is able to be seen by new pcp.   Requested Prescriptions     Pending Prescriptions Disp Refills    gabapentin (NEURONTIN) 300 mg capsule 90 Cap 2

## 2019-06-08 PROBLEM — I21.A1 TYPE 2 MYOCARDIAL INFARCTION (HCC): Status: ACTIVE | Noted: 2019-01-01

## 2019-06-08 PROBLEM — J44.1 COPD WITH ACUTE EXACERBATION (HCC): Status: ACTIVE | Noted: 2019-01-01

## 2019-06-08 PROBLEM — J96.01 ACUTE RESPIRATORY FAILURE WITH HYPOXIA AND HYPERCAPNIA (HCC): Status: ACTIVE | Noted: 2019-01-01

## 2019-06-08 PROBLEM — J96.02 ACUTE RESPIRATORY FAILURE WITH HYPOXIA AND HYPERCAPNIA (HCC): Status: ACTIVE | Noted: 2019-01-01

## 2019-06-08 PROBLEM — G93.40 ACUTE ENCEPHALOPATHY: Status: ACTIVE | Noted: 2019-01-01

## 2019-06-08 PROBLEM — N17.9 AKI (ACUTE KIDNEY INJURY) (HCC): Status: ACTIVE | Noted: 2019-01-01

## 2019-06-08 PROBLEM — E87.5 HYPERKALEMIA: Status: ACTIVE | Noted: 2019-01-01

## 2019-06-25 PROBLEM — J44.9 COPD (CHRONIC OBSTRUCTIVE PULMONARY DISEASE) (HCC): Status: ACTIVE | Noted: 2019-01-01

## 2019-06-25 PROBLEM — A41.9 SEPSIS (HCC): Status: ACTIVE | Noted: 2019-01-01

## 2019-06-25 PROBLEM — J18.9 PNEUMONIA: Status: ACTIVE | Noted: 2019-01-01

## 2019-06-25 PROBLEM — J96.20 ACUTE ON CHRONIC RESPIRATORY FAILURE (HCC): Status: ACTIVE | Noted: 2019-01-01

## 2023-05-06 NOTE — PROGRESS NOTES
[facility]  Outpatient Pulmonary Rehabilitation Flowsheet  [address], [address] [place], [address]    Callie Males  1940       Patient's carry-over of treatment delivered by: Performs PLB when SOB. Objective Daily Findings      Respiratory Muscle Functioning/Exercise Conditioning/Strengthening Session:    Time In: 12:30  Time Out::1;30  Session Number: 11  O2 with Theapy: 0 L/min    Pre SPO2 94  Pre HR:88  Pre BP: 132/82    RR: 18 Wt: 148  Temp: not tested by patient   Post SPO2: 96 Post HR: 93  Post BP: 127/81    Self Care Home Management Instruction/Education    Self Care Home Management Instruction Education: Breathing Retaining and Exercise Concepts, COPD, exercise safety and mgmt. , collaborative self mgmt. , medications, HTN, nutrition, smoking cessation, stress mgmt. Patient's Response to Education: Patient actively participated in education. Individual Therapy               Goal     Actual                      During Therapy                 Post-Therapy     % SpO2 HR RPD 1 - 4 RPE 6-20 Pain  1 - 10 % SpO2 HR RPD 1 - 4 RPE 6-20 Pain 0 - 10   Stretching/DB  /Monitoring               IS 20 min x 1750 10 min x 1500 96 80 1 7 1 97 76 1 8 1   Arm bike 30 min x 25 coy 25 min x 20 coy 95 70 2 13 1 96 97 2 13 1   Stepper 30 min x L5 25 min x L5 96 93 2 12 1 96 95 2 12 1   Weighted DB                Arm Weights                 Patient's Response to Therapy: Good effort and Good motivation.     Time In:      Session Number:  Time Out:      O2 with therapy:  L/min         Individual Therapy               Goal     Actual                      During Therapy           Post Therapy     % SpO2 HR RPD 1 - 4 RPE 6-20 Pain  1 - 10 % SpO2 HR RPD 1 - 4 RPE 6-20 Pain 0 - 10   Stretching/DB  /Monitoring               Stat Bike               Stepper               Treadmill               Rest/PLB                    Patient's response to therapy: Increase fatique. Assessment    Patient's response: Patient progressing towardsd LTGs evident by:  Decreased Fatique, Increased PLB and Increased Pacing. Plan: Continue as written    Code     Billin units      Physician supervision provided this date of service by: Dr. Leonora Coleman     Therapist Signature:  GIANCARLO Santa    Therapist PRINTED Name and Credential: GIANCARLO Santa    2017  3:44 PM 98